# Patient Record
Sex: FEMALE | Race: WHITE | Employment: OTHER | URBAN - METROPOLITAN AREA
[De-identification: names, ages, dates, MRNs, and addresses within clinical notes are randomized per-mention and may not be internally consistent; named-entity substitution may affect disease eponyms.]

---

## 2017-01-10 PROBLEM — S22.000A CLOSED COMPRESSION FRACTURE OF THORACIC VERTEBRA (HCC): Status: ACTIVE | Noted: 2017-01-10

## 2017-01-11 PROBLEM — M81.0 OSTEOPOROSIS: Status: ACTIVE | Noted: 2017-01-11

## 2017-01-11 PROBLEM — I10 HTN, GOAL BELOW 140/80: Status: ACTIVE | Noted: 2017-01-11

## 2017-01-11 PROBLEM — E78.5 LIPIDS BLOOD INCREASED: Status: ACTIVE | Noted: 2017-01-11

## 2017-01-11 PROBLEM — F41.1 ANXIETY AS ACUTE REACTION TO GROSS STRESS: Status: ACTIVE | Noted: 2017-01-11

## 2017-01-11 PROBLEM — F43.0 ANXIETY AS ACUTE REACTION TO GROSS STRESS: Status: ACTIVE | Noted: 2017-01-11

## 2017-01-12 PROBLEM — I65.23 ASYMPTOMATIC BILATERAL CAROTID ARTERY STENOSIS: Chronic | Status: ACTIVE | Noted: 2017-01-12

## 2019-09-04 LAB
AVERAGE GLUCOSE: NORMAL
HBA1C MFR BLD: 5.7 %

## 2020-01-07 LAB
ALBUMIN SERPL-MCNC: NORMAL G/DL
ALP BLD-CCNC: NORMAL U/L
ALT SERPL-CCNC: NORMAL U/L
ANION GAP SERPL CALCULATED.3IONS-SCNC: NORMAL MMOL/L
AST SERPL-CCNC: NORMAL U/L
BILIRUB SERPL-MCNC: NORMAL MG/DL
BUN BLDV-MCNC: NORMAL MG/DL
CALCIUM SERPL-MCNC: NORMAL MG/DL
CHLORIDE BLD-SCNC: NORMAL MMOL/L
CHOLESTEROL, TOTAL: NORMAL
CHOLESTEROL/HDL RATIO: NORMAL
CO2: NORMAL
CREAT SERPL-MCNC: NORMAL MG/DL
GFR CALCULATED: NORMAL
GLUCOSE BLD-MCNC: NORMAL MG/DL
HDLC SERPL-MCNC: NORMAL MG/DL
LDL CHOLESTEROL CALCULATED: NORMAL
POTASSIUM SERPL-SCNC: NORMAL MMOL/L
SODIUM BLD-SCNC: NORMAL MMOL/L
TOTAL PROTEIN: NORMAL
TRIGL SERPL-MCNC: NORMAL MG/DL
VLDLC SERPL CALC-MCNC: NORMAL MG/DL

## 2020-06-30 VITALS
DIASTOLIC BLOOD PRESSURE: 75 MMHG | WEIGHT: 143 LBS | BODY MASS INDEX: 30.85 KG/M2 | SYSTOLIC BLOOD PRESSURE: 123 MMHG | HEIGHT: 57 IN

## 2020-06-30 RX ORDER — MECLIZINE HYDROCHLORIDE 25 MG/1
25 TABLET ORAL DAILY PRN
COMMUNITY
End: 2020-07-09

## 2020-06-30 RX ORDER — ASPIRIN 81 MG/1
81 TABLET ORAL DAILY
COMMUNITY
End: 2020-07-09

## 2020-07-09 ENCOUNTER — OFFICE VISIT (OUTPATIENT)
Dept: PRIMARY CARE CLINIC | Age: 80
End: 2020-07-09
Payer: MEDICARE

## 2020-07-09 VITALS
SYSTOLIC BLOOD PRESSURE: 138 MMHG | HEIGHT: 60 IN | OXYGEN SATURATION: 96 % | DIASTOLIC BLOOD PRESSURE: 75 MMHG | WEIGHT: 135 LBS | HEART RATE: 79 BPM | RESPIRATION RATE: 18 BRPM | BODY MASS INDEX: 26.5 KG/M2 | TEMPERATURE: 97.5 F

## 2020-07-09 PROBLEM — F41.1 ANXIETY AS ACUTE REACTION TO GROSS STRESS: Status: RESOLVED | Noted: 2017-01-11 | Resolved: 2020-07-09

## 2020-07-09 PROBLEM — G31.84 MCI (MILD COGNITIVE IMPAIRMENT): Status: ACTIVE | Noted: 2020-07-09

## 2020-07-09 PROBLEM — I10 ESSENTIAL HYPERTENSION: Status: ACTIVE | Noted: 2020-07-09

## 2020-07-09 PROBLEM — F43.0 ANXIETY AS ACUTE REACTION TO GROSS STRESS: Status: RESOLVED | Noted: 2017-01-11 | Resolved: 2020-07-09

## 2020-07-09 PROBLEM — E78.5 LIPIDS BLOOD INCREASED: Status: RESOLVED | Noted: 2017-01-11 | Resolved: 2020-07-09

## 2020-07-09 PROBLEM — I10 HTN, GOAL BELOW 140/80: Status: RESOLVED | Noted: 2017-01-11 | Resolved: 2020-07-09

## 2020-07-09 PROCEDURE — 99213 OFFICE O/P EST LOW 20 MIN: CPT | Performed by: INTERNAL MEDICINE

## 2020-07-09 RX ORDER — ALENDRONATE SODIUM 70 MG/1
TABLET ORAL
COMMUNITY
Start: 2020-05-04 | End: 2020-10-13

## 2020-07-09 ASSESSMENT — PATIENT HEALTH QUESTIONNAIRE - PHQ9
SUM OF ALL RESPONSES TO PHQ QUESTIONS 1-9: 0
2. FEELING DOWN, DEPRESSED OR HOPELESS: 0
1. LITTLE INTEREST OR PLEASURE IN DOING THINGS: 0
SUM OF ALL RESPONSES TO PHQ9 QUESTIONS 1 & 2: 0
SUM OF ALL RESPONSES TO PHQ QUESTIONS 1-9: 0

## 2020-07-09 NOTE — PROGRESS NOTES
Brenda Alamo  7/[birth year]     Chief Complaint   Patient presents with    Hyperlipidemia     check up        Allergies   Allergen Reactions    Alendronate Sodium         Current Outpatient Medications   Medication Sig Dispense Refill    alendronate (FOSAMAX) 70 MG tablet take 1 tablet by mouth every week      buPROPion (WELLBUTRIN SR) 150 MG extended release tablet Take 150 mg by mouth 2 times daily  3    desvenlafaxine succinate (PRISTIQ) 100 MG TB24 extended release tablet Take 100 mg by mouth daily  3     No current facility-administered medications for this visit. HPI: Patient comes in for routine follow-up visit. Her family is concerned because she has been somewhat delusional at times and suspicious that her family members are stealing from her. She is living alone and has been independent for quite some time. Her family checks on her frequently. She is physically active and walks in her neighborhood. She is still cooking for herself. She is not driving currently. Her family states that she does not take her medication as prescribed. She has a history of chronic anxiety and depression, hyperlipidemia, and osteoporosis. Daughter is requesting referral to a geriatric specialist for further evaluation and treatment. She does have prescriptions for Pristiq 100 mg daily and Wellbutrin  mg daily, but it is doubtful that she is taking either 1 of them at this time. Review of Systems: as per HPI      Physical Exam:    Patient is a [de-identified] y.o. female. Patient appears to be in no distress. Breathing comfortably. Ambulates without assistance. HEENT: normal.  Neck supple, no adenopathy or bruits. Heart RR, no MGR. Lungs clear. Abd: normal  Ext.: no edema. Peripheral pulses: normal.  No neurologic deficits noted. She becomes agitated when talking about her children.      Lab Results   Component Value Date    WBC 3.5 (L) 01/10/2017    HGB 12.0 01/10/2017    HCT 36.2 01/10/2017     01/10/2017    CHOL 265 (H) 01/10/2017    TRIG 217 (H) 01/10/2017    HDL 44 01/10/2017    ALT 16 01/10/2017    AST 16 01/10/2017    TSH 2.660 01/10/2017    INR 1.1 01/09/2017    LABA1C 5.7 09/04/2019      Lab Results   Component Value Date     01/10/2017    K 4.4 01/10/2017     01/10/2017    CO2 26 01/10/2017    BUN 16 01/10/2017    CREATININE 0.7 01/10/2017    GLUCOSE 93 01/10/2017    CALCIUM 8.9 01/10/2017    PROT 6.1 (L) 01/10/2017    LABALBU 4.0 01/10/2017    BILITOT 0.3 01/10/2017    ALKPHOS 52 01/10/2017    AST 16 01/10/2017    ALT 16 01/10/2017    LABGLOM >60 01/10/2017    GFRAA >60 01/10/2017            Assessment and Plan:    Brian Pemberton was seen today for hyperlipidemia. Diagnoses and all orders for this visit:    Mixed hyperlipidemia    Essential hypertension    Anxiety and depression    Age-related osteoporosis without current pathological fracture    MCI (mild cognitive impairment), associated with delusional thinking and noncompliance with her medications. Discussion Notes: Patient is strongly advised to take her medications as prescribed. Advised referral to geriatric specialist but she adamantly refuses. Will get labs including a CMP, CBC, lipid panel, and TSH, and will make further recommendations depending on results. No follow-ups on file.

## 2020-07-09 NOTE — PROGRESS NOTES
I talked to the patient's daughter, Marlin Bermudez, regarding today's visit with Gerardo Elmore. The patient probably has dementia with delusional thinking and she is uncooperative with the family. The family requested referral to a geriatric specialist.  We will refer her to Dr. Emily Nunez, geriatric specialist, for further evaluation and treatment of her dementia. Hopefully she will agree to go to that appointment. GERD contacts at this time are: Salima Vasques, [address] [facility], and Antoine Butterfield, [phone].

## 2020-07-14 ENCOUNTER — TELEPHONE (OUTPATIENT)
Dept: PRIMARY CARE CLINIC | Age: 80
End: 2020-07-14

## 2020-07-14 NOTE — TELEPHONE ENCOUNTER
Dr Maine Mckeon did ref to Dr Yolette Salmeron.  Family asking if we schedule that or if Yolette Salmeron office will call them     [facility] [phone]

## 2020-09-08 RX ORDER — ROSUVASTATIN CALCIUM 40 MG/1
40 TABLET, COATED ORAL EVERY EVENING
Qty: 90 TABLET | Refills: 3 | Status: SHIPPED
Start: 2020-09-08 | End: 2020-10-13 | Stop reason: SDUPTHER

## 2020-09-08 RX ORDER — ROSUVASTATIN CALCIUM 40 MG/1
40 TABLET, COATED ORAL EVERY EVENING
COMMUNITY
End: 2020-09-08 | Stop reason: SDUPTHER

## 2020-09-30 ENCOUNTER — INITIAL CONSULT (OUTPATIENT)
Dept: GERIATRIC MEDICINE | Age: 80
End: 2020-09-30
Payer: MEDICARE

## 2020-09-30 VITALS
BODY MASS INDEX: 29.77 KG/M2 | WEIGHT: 138 LBS | HEIGHT: 57 IN | TEMPERATURE: 97.1 F | HEART RATE: 108 BPM | DIASTOLIC BLOOD PRESSURE: 60 MMHG | RESPIRATION RATE: 24 BRPM | SYSTOLIC BLOOD PRESSURE: 124 MMHG

## 2020-09-30 PROCEDURE — 1036F TOBACCO NON-USER: CPT | Performed by: INTERNAL MEDICINE

## 2020-09-30 PROCEDURE — 1090F PRES/ABSN URINE INCON ASSESS: CPT | Performed by: INTERNAL MEDICINE

## 2020-09-30 PROCEDURE — G8417 CALC BMI ABV UP PARAM F/U: HCPCS | Performed by: INTERNAL MEDICINE

## 2020-09-30 PROCEDURE — 1123F ACP DISCUSS/DSCN MKR DOCD: CPT | Performed by: INTERNAL MEDICINE

## 2020-09-30 PROCEDURE — G8399 PT W/DXA RESULTS DOCUMENT: HCPCS | Performed by: INTERNAL MEDICINE

## 2020-09-30 PROCEDURE — 99212 OFFICE O/P EST SF 10 MIN: CPT | Performed by: INTERNAL MEDICINE

## 2020-09-30 PROCEDURE — G8427 DOCREV CUR MEDS BY ELIG CLIN: HCPCS | Performed by: INTERNAL MEDICINE

## 2020-09-30 PROCEDURE — 4040F PNEUMOC VAC/ADMIN/RCVD: CPT | Performed by: INTERNAL MEDICINE

## 2020-09-30 NOTE — PROGRESS NOTES
Chief Complaint   Patient presents with    Consultation     Referral from PCP, Dr Frances Padron re: dementia. Pt states she lives alone in a 4 bedroom home, drives, does grocery shopping, cooks, does her banking, takes care of her dog -- walks the dog at least 1/2 hour each night. \"I take care of everything\". Does tell repetative stories. Does not want her family in the exam room with her.

## 2020-09-30 NOTE — PROGRESS NOTES
Met with 2 kids   And Mother on [address] alone   Good days and bad days   Someone there every day   ADL's OK Might be changing   Uses stove and no fires   Never used microwave   Speaks English  Lots of paranoia   Paranoid about many things Written epistle  Many notes to remember  Kids are stealing   Memory issues are progressing   Dad  3 years ago   NOT TAKING MEDS AND WILL NOT  IADL's covered and probaby should not   Knows a lot of details  And 2/3  Fix  Knew details of family and kids names   HYM? \"Fine\"  Repetitive   Epistle read  [facility] year? Anniversary ? Knows details of his death  No ETOH  Cooking good   Guns may be in house? ?   No hallucinations  And is delusion  BB OK  Impression:Early SDAT and early                     ADL preservation                     Paranoia   Plan: Consider med therapy and follow

## 2020-10-06 ENCOUNTER — TELEPHONE (OUTPATIENT)
Dept: GERIATRIC MEDICINE | Age: 80
End: 2020-10-06

## 2020-10-06 NOTE — TELEPHONE ENCOUNTER
Pt seen on consult on 9/30/2020. Daughter, Itzel Olsen would like DR to call her to give her an update, answer any questions. Daughter is listed on the HIPAA form.

## 2020-10-12 RX ORDER — DONEPEZIL HYDROCHLORIDE 5 MG/1
5 TABLET, FILM COATED ORAL
Qty: 30 TABLET | Refills: 3 | Status: SHIPPED
Start: 2020-10-12 | End: 2021-02-03

## 2020-10-13 ENCOUNTER — TELEPHONE (OUTPATIENT)
Dept: PRIMARY CARE CLINIC | Age: 80
End: 2020-10-13

## 2020-10-13 RX ORDER — BUPROPION HYDROCHLORIDE 150 MG/1
150 TABLET, EXTENDED RELEASE ORAL 2 TIMES DAILY
Qty: 60 TABLET | Refills: 5 | Status: SHIPPED
Start: 2020-10-13 | End: 2021-02-03

## 2020-10-13 RX ORDER — ALENDRONATE SODIUM 70 MG/1
TABLET ORAL
Qty: 4 TABLET | Refills: 5 | Status: CANCELLED | OUTPATIENT
Start: 2020-10-13

## 2020-10-13 RX ORDER — ROSUVASTATIN CALCIUM 40 MG/1
40 TABLET, COATED ORAL EVERY EVENING
Qty: 30 TABLET | Refills: 5 | Status: SHIPPED
Start: 2020-10-13 | End: 2021-02-03

## 2020-10-13 RX ORDER — DESVENLAFAXINE 100 MG/1
100 TABLET, EXTENDED RELEASE ORAL DAILY
Qty: 30 TABLET | Refills: 5 | Status: SHIPPED
Start: 2020-10-13 | End: 2021-02-03

## 2020-10-15 ENCOUNTER — TELEPHONE (OUTPATIENT)
Dept: PRIMARY CARE CLINIC | Age: 80
End: 2020-10-15

## 2020-10-15 NOTE — TELEPHONE ENCOUNTER
TREASURE. ...... Colin Mclaughlin Daughter phoned Pristiq very expensive. Family is going to just try Wellbutrin, Aricept, and rosuvastatin. They will follow up here in a month or so.

## 2021-01-25 ENCOUNTER — TELEPHONE (OUTPATIENT)
Dept: GERIATRIC MEDICINE | Age: 81
End: 2021-01-25

## 2021-01-25 NOTE — TELEPHONE ENCOUNTER
Pt daughter called states that mom has appt on Feb 3. Family is requesting that you tell mom she has alz and needs to take her meds.  Daughter states that mom throws her meds away

## 2021-02-03 ENCOUNTER — OFFICE VISIT (OUTPATIENT)
Dept: GERIATRIC MEDICINE | Age: 81
End: 2021-02-03
Payer: MEDICARE

## 2021-02-03 VITALS
SYSTOLIC BLOOD PRESSURE: 116 MMHG | RESPIRATION RATE: 18 BRPM | TEMPERATURE: 97.3 F | HEART RATE: 96 BPM | DIASTOLIC BLOOD PRESSURE: 68 MMHG

## 2021-02-03 DIAGNOSIS — F02.818 LATE ONSET ALZHEIMER'S DISEASE WITH BEHAVIORAL DISTURBANCE (HCC): Primary | ICD-10-CM

## 2021-02-03 DIAGNOSIS — G30.1 LATE ONSET ALZHEIMER'S DISEASE WITH BEHAVIORAL DISTURBANCE (HCC): Primary | ICD-10-CM

## 2021-02-03 PROCEDURE — G8427 DOCREV CUR MEDS BY ELIG CLIN: HCPCS | Performed by: INTERNAL MEDICINE

## 2021-02-03 PROCEDURE — G8417 CALC BMI ABV UP PARAM F/U: HCPCS | Performed by: INTERNAL MEDICINE

## 2021-02-03 PROCEDURE — 1090F PRES/ABSN URINE INCON ASSESS: CPT | Performed by: INTERNAL MEDICINE

## 2021-02-03 PROCEDURE — 99212 OFFICE O/P EST SF 10 MIN: CPT | Performed by: INTERNAL MEDICINE

## 2021-02-03 PROCEDURE — G8399 PT W/DXA RESULTS DOCUMENT: HCPCS | Performed by: INTERNAL MEDICINE

## 2021-02-03 PROCEDURE — 1123F ACP DISCUSS/DSCN MKR DOCD: CPT | Performed by: INTERNAL MEDICINE

## 2021-02-03 PROCEDURE — 1036F TOBACCO NON-USER: CPT | Performed by: INTERNAL MEDICINE

## 2021-02-03 PROCEDURE — G8484 FLU IMMUNIZE NO ADMIN: HCPCS | Performed by: INTERNAL MEDICINE

## 2021-02-03 PROCEDURE — 4040F PNEUMOC VAC/ADMIN/RCVD: CPT | Performed by: INTERNAL MEDICINE

## 2021-02-03 SDOH — ECONOMIC STABILITY: INCOME INSECURITY: HOW HARD IS IT FOR YOU TO PAY FOR THE VERY BASICS LIKE FOOD, HOUSING, MEDICAL CARE, AND HEATING?: NOT HARD AT ALL

## 2021-02-03 SDOH — ECONOMIC STABILITY: TRANSPORTATION INSECURITY
IN THE PAST 12 MONTHS, HAS LACK OF TRANSPORTATION KEPT YOU FROM MEETINGS, WORK, OR FROM GETTING THINGS NEEDED FOR DAILY LIVING?: NOT ASKED

## 2021-02-03 SDOH — ECONOMIC STABILITY: FOOD INSECURITY: WITHIN THE PAST 12 MONTHS, YOU WORRIED THAT YOUR FOOD WOULD RUN OUT BEFORE YOU GOT MONEY TO BUY MORE.: NEVER TRUE

## 2021-02-03 ASSESSMENT — PATIENT HEALTH QUESTIONNAIRE - PHQ9
1. LITTLE INTEREST OR PLEASURE IN DOING THINGS: 0
2. FEELING DOWN, DEPRESSED OR HOPELESS: 0
SUM OF ALL RESPONSES TO PHQ QUESTIONS 1-9: 0

## 2021-02-03 NOTE — PROGRESS NOTES
Chief Complaint   Patient presents with    Follow-up     2nd visit. Last seen in September re: memory issues. See 1/25/21 phone encounter note from daughter -- wants DR to tell pt that she has Alzheimer's and needs to take her med. (Note indicates that daughter feels pt throws her meds out).  Discuss Medications     Pt states she does not take any meds.

## 2021-02-03 NOTE — PROGRESS NOTES
CC No complaints except waiting too long  HAving no insight into her Dementia    Here alone and would like to finish exam ASAP  \"Her son is wailing for her\"  President : Tsering [place]os[place] but trouble naming him  May be driving and unsure what is going on with IADL's except we know she doesn't take meds  She claims she is driving to [address] she is cooking  And No written epistle available  Appears  to understand English  And not willing to try on some questions like spelling [place]  But did subtract 7 from 1100 and 93 but unwilling to go on  17/27 and probable mild Dementia at home withj family support  May be doing her ADL's and dressed well  Unwilling/able to take meds  Plan; Continue to support her at home and keep safe

## 2021-02-05 ENCOUNTER — TELEPHONE (OUTPATIENT)
Dept: GERIATRIC MEDICINE | Age: 81
End: 2021-02-05

## 2021-02-05 NOTE — TELEPHONE ENCOUNTER
Called daughter and left message and explained my main concern is to keep her safe at home, knowing that she will not take any meds.

## 2021-02-05 NOTE — TELEPHONE ENCOUNTER
Pt daughter called states that evangelina was here on Wed and would not leave son come back with her.  Daughter called and wants to know what you told celestino Wed Please call her

## 2021-04-10 ENCOUNTER — HOSPITAL ENCOUNTER (EMERGENCY)
Age: 81
Discharge: HOME | End: 2021-04-10
Payer: MEDICARE

## 2021-04-10 ENCOUNTER — APPOINTMENT (OUTPATIENT)
Dept: GENERAL RADIOLOGY | Age: 81
End: 2021-04-10
Payer: MEDICARE

## 2021-04-10 VITALS
HEIGHT: 60 IN | OXYGEN SATURATION: 97 % | DIASTOLIC BLOOD PRESSURE: 79 MMHG | SYSTOLIC BLOOD PRESSURE: 123 MMHG | BODY MASS INDEX: 27.48 KG/M2 | RESPIRATION RATE: 14 BRPM | TEMPERATURE: 97 F | HEART RATE: 91 BPM | WEIGHT: 140 LBS

## 2021-04-10 DIAGNOSIS — S60.221A CONTUSION OF RIGHT HAND, INITIAL ENCOUNTER: ICD-10-CM
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
## 2021-04-10 DIAGNOSIS — S62.91XA CLOSED FRACTURE OF RIGHT HAND, INITIAL ENCOUNTER: Primary | ICD-10-CM

## 2021-04-10 PROCEDURE — 73130 X-RAY EXAM OF HAND: CPT

## 2021-04-10 PROCEDURE — 99284 EMERGENCY DEPT VISIT MOD MDM: CPT

## 2021-04-10 PROCEDURE — 73090 X-RAY EXAM OF FOREARM: CPT

## 2021-04-10 PROCEDURE — 29125 APPL SHORT ARM SPLINT STATIC: CPT

## 2021-04-10 PROCEDURE — 73110 X-RAY EXAM OF WRIST: CPT

## 2021-04-10 PROCEDURE — 99283 EMERGENCY DEPT VISIT LOW MDM: CPT

## 2021-04-10 RX ORDER — ACETAMINOPHEN 500 MG
500 TABLET ORAL EVERY 6 HOURS PRN
Qty: 60 TABLET | Refills: 3 | Status: SHIPPED | OUTPATIENT
Start: 2021-04-10 | End: 2021-09-14

## 2021-04-10 ASSESSMENT — PAIN DESCRIPTION - LOCATION: LOCATION: ARM;HAND

## 2021-04-10 ASSESSMENT — PAIN DESCRIPTION - ORIENTATION: ORIENTATION: RIGHT

## 2021-04-10 ASSESSMENT — PAIN SCALES - GENERAL: PAINLEVEL_OUTOF10: 8

## 2021-04-10 NOTE — ED PROVIDER NOTES
[facility]     Department of Emergency Medicine   ED  Provider Note  Admit Date/RoomTime: 4/10/2021  9:25 AM  ED Room: 29/29    Chief Complaint:   Hand Injury (mechanical fall yesterday ) and Arm Injury    History of Present Illness      Robert Montilla is a 80 y.o. old female presents to the emergency department for right hand and wrist pain. Patient states she tripped while she was letting her dog outlast night. He fell onto her left hand. She denies hitting her head or having any loss of consciousness. Patient has significant swelling to right hand and wrist. She has pain with rotation and movement of her hand and wrist. She has no headache, dizziness, abdominal pain, nausea, vomiting, vision changes, chest pain, SOB, leg pain or swelling, neck pain, back pain, loss of bowel or bladder, or difficulty with ambulation or balance. Patient is alert and oriented x3 and in no apparent distress at this exam. Patient is nontoxic appearing. PCP: Dr. Abraham BRYAN   Pertinent positives and negatives are stated within HPI, all other systems reviewed and are negative. Past Medical History:   Past Medical History:   Diagnosis Date    Anxiety as acute reaction to gross stress 1/11/2017    Asymptomatic bilateral carotid artery stenosis 1/12/2017    HTN, goal below 140/80 1/11/2017    STRESS ??   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    DILATION AND CURETTAGE OF UTERUS  3/20/2002    uterine fibroids x 2    FIXATION KYPHOPLASTY  01/12/2017    t7     Social History:  reports that she has never smoked. She has never used smokeless tobacco. She reports that she does not drink alcohol or use drugs.     Family History: family history is not on file. Allergies: Alendronate sodium    Physical Exam     Vitals:    04/10/21 0911   BP: 123/79   Pulse: 91   Resp: 14   Temp: 97 °F (36.1 °C)   TempSrc: Temporal   SpO2: 97%   Weight: 140 lb (63.5 kg)   Height: 5' (1.524 m)     Oxygen Saturation Interpretation: Normal.    Constitutional:  Alert and oriented x3, development consistent with age, NAD  HEENT:  NC/NT. Airway patent. No bony tenderness, no abrasions or lacerations, no blood in ears or nares, PERRLA, EOMI   Neck:  Normal ROM. Supple. Non tender   CVS: Regular rate for age, normal rhythm  Resp: Clear and equal bilaterally with good airflow, no respiratory distress  Chest: Non-tender   Extremity(s):  Right: hand and wrist.               Tenderness: Moderate to wrist and dorsal aspect of hand             Swelling: Moderate. Maximal swelling to dorsal aspect of right hand, mild to wrist              Deformity[de-identified] Slight             ROM: diminished range with pain. Majority of pain in wrist with rotation              Skin:  no erythema, rash or wounds noted. Mild early stage bruising    Neurovascular: Motor deficit: none. Sensory deficit: none. Intact distally                Pulse deficit: none. Strong radial                Capillary refill: normal. <3 seconds x5 fingers   Ring removed from 4th and 5th fingers  No pain with palpation to right elbow or shoulder  No TTP to left UE or legs   Neurological: GCS 15 Motor functions intact. Lab / Imaging Results   (All laboratory and radiology results have been personally reviewed by myself)  Labs:  No results found for this visit on 04/10/21. Imaging: All Radiology results interpreted by Radiologist unless otherwise noted. XR WRIST RIGHT (MIN 3 VIEWS)   Final Result   There are signs of remote trauma involving the trapezium with secondary   erosive osteoarthritic changes at the 1st metacarpal-phalangeal and 1st   metacarpal there navicular articulations. XR HAND RIGHT (MIN 3 VIEWS)   Final Result   Degenerative changes in the right wrist and the fingers as commented the,   particularly in the radial side of the carpal-metacarpal joint. No acute fracture seen in the right hand. XR RADIUS ULNA RIGHT (2 VIEWS)   Final Result   1. Minimal impaction of the base of radial head of undetermined age. Correlation with x-ray series of the right elbow recommended and also with   clinical data. 2.  Mild soft tissue swelling in the distal forearm towards the dorsal aspect   of the right hand. 3.  No fracture seen in the right ulna. ED Course / Medical Decision Making   Medications - No data to display    ED Course as of Apr 10 1131   Sat Apr 10, 2021   1030 XR showed age indeterminate radial head impaction fracture- patient has NO point tenderness to elbow    XR RADIUS ULNA RIGHT (2 VIEWS) [RM]      ED Course User Index  [RM] Kerrin Kanner, PA-C     Consult:  None    Procedure(s):  none    MDM:       Films were obtained based on high suspicion for bony injury as per history/physical findings . Plan is subsequently for symptom control, limited use and  immobilization with appropriate outpatient follow-up. XR showed age indeterminate radial head fx- patient has NO point tenderness to elbow     Counseling: The emergency provider has spoken with the patient/caregiver and discussed todays results, in addition to providing specific details for the plan of care and counseling regarding the diagnosis and prognosis. Questions are answered at this time and they are agreeable with the plan. Patient understands that they must follow-up with orthopedics. They're educated on signs and symptoms that would require emergent return to the ED. RICE discussed. They were educated on newly prescribed medications and instructed on sling and splint use. Vitals stable and patient in no distress at discharge. Assessment      1.  Closed fracture of right hand, initial encounter    2. Fall on same level from slipping, tripping or stumbling, initial encounter    3. Contusion of right hand, initial encounter      Plan   Discharge to home  Patient condition is good    New Medications     New Prescriptions    ACETAMINOPHEN (APAP EXTRA STRENGTH) 500 MG TABLET    Take 1 tablet by mouth every 6 hours as needed for Pain       Electronically signed by Monty Chavez PA-C   DD: 4/10/21  **This report was transcribed using voice recognition software. Every effort was made to ensure accuracy; however, inadvertent computerized transcription errors may be present.   END OF ED PROVIDER NOTE        Monty Chavez PA-C  04/10/21 1137

## 2021-06-14 ENCOUNTER — TELEPHONE (OUTPATIENT)
Dept: GERIATRIC MEDICINE | Age: 81
End: 2021-06-14

## 2021-08-10 ENCOUNTER — OFFICE VISIT (OUTPATIENT)
Dept: GERIATRIC MEDICINE | Age: 81
End: 2021-08-10
Payer: MEDICARE

## 2021-08-10 VITALS
DIASTOLIC BLOOD PRESSURE: 66 MMHG | HEIGHT: 58 IN | TEMPERATURE: 97.3 F | BODY MASS INDEX: 29.72 KG/M2 | SYSTOLIC BLOOD PRESSURE: 128 MMHG | WEIGHT: 141.6 LBS | HEART RATE: 84 BPM | RESPIRATION RATE: 20 BRPM

## 2021-08-10 DIAGNOSIS — G30.9 ALZHEIMER DISEASE (HCC): Primary | ICD-10-CM

## 2021-08-10 DIAGNOSIS — F02.80 ALZHEIMER DISEASE (HCC): Primary | ICD-10-CM

## 2021-08-10 PROCEDURE — G8399 PT W/DXA RESULTS DOCUMENT: HCPCS | Performed by: INTERNAL MEDICINE

## 2021-08-10 PROCEDURE — 99212 OFFICE O/P EST SF 10 MIN: CPT | Performed by: INTERNAL MEDICINE

## 2021-08-10 PROCEDURE — 1036F TOBACCO NON-USER: CPT | Performed by: INTERNAL MEDICINE

## 2021-08-10 PROCEDURE — 1090F PRES/ABSN URINE INCON ASSESS: CPT | Performed by: INTERNAL MEDICINE

## 2021-08-10 PROCEDURE — G8417 CALC BMI ABV UP PARAM F/U: HCPCS | Performed by: INTERNAL MEDICINE

## 2021-08-10 PROCEDURE — G8427 DOCREV CUR MEDS BY ELIG CLIN: HCPCS | Performed by: INTERNAL MEDICINE

## 2021-08-10 PROCEDURE — 4040F PNEUMOC VAC/ADMIN/RCVD: CPT | Performed by: INTERNAL MEDICINE

## 2021-08-10 PROCEDURE — 1123F ACP DISCUSS/DSCN MKR DOCD: CPT | Performed by: INTERNAL MEDICINE

## 2021-08-10 RX ORDER — DONEPEZIL HYDROCHLORIDE 5 MG/1
2.5 TABLET, FILM COATED ORAL
Qty: 15 TABLET | Refills: 3 | Status: SHIPPED
Start: 2021-08-10 | End: 2021-09-14

## 2021-08-10 ASSESSMENT — LIFESTYLE VARIABLES
HOW OFTEN DO YOU HAVE A DRINK CONTAINING ALCOHOL: 2-3 TIMES A WEEK
HOW MANY STANDARD DRINKS CONTAINING ALCOHOL DO YOU HAVE ON A TYPICAL DAY: 1 OR 2

## 2021-08-10 ASSESSMENT — PATIENT HEALTH QUESTIONNAIRE - PHQ9
SUM OF ALL RESPONSES TO PHQ QUESTIONS 1-9: 0
1. LITTLE INTEREST OR PLEASURE IN DOING THINGS: 0
2. FEELING DOWN, DEPRESSED OR HOPELESS: 0
SUM OF ALL RESPONSES TO PHQ9 QUESTIONS 1 & 2: 0
SUM OF ALL RESPONSES TO PHQ QUESTIONS 1-9: 0
SUM OF ALL RESPONSES TO PHQ QUESTIONS 1-9: 0

## 2021-08-10 NOTE — PATIENT INSTRUCTIONS
Patient Education        Preventing Falls: Care Instructions  Your Care Instructions     Getting around your home safely can be a challenge if you have injuries or health problems that make it easy for you to fall. Loose rugs and furniture in walkways are among the dangers for many older people who have problems walking or who have poor eyesight. People who have conditions such as arthritis, osteoporosis, or dementia also have to be careful not to fall. You can make your home safer with a few simple measures. Follow-up care is a key part of your treatment and safety. Be sure to make and go to all appointments, and call your doctor if you are having problems. It's also a good idea to know your test results and keep a list of the medicines you take. How can you care for yourself at home? Taking care of yourself  · You may get dizzy if you do not drink enough water. To prevent dehydration, drink plenty of fluids. Choose water and other caffeine-free clear liquids. If you have kidney, heart, or liver disease and have to limit fluids, talk with your doctor before you increase the amount of fluids you drink. · Exercise regularly to improve your strength, muscle tone, and balance. Walk if you can. Swimming may be a good choice if you cannot walk easily. · Have your vision and hearing checked each year or any time you notice a change. If you have trouble seeing and hearing, you might not be able to avoid objects and could lose your balance. · Know the side effects of the medicines you take. Ask your doctor or pharmacist whether the medicines you take can affect your balance. Sleeping pills or sedatives can affect your balance. · Limit the amount of alcohol you drink. Alcohol can impair your balance and other senses. · Ask your doctor whether calluses or corns on your feet need to be removed. If you wear loose-fitting shoes because of calluses or corns, you can lose your balance and fall.   · Talk to your doctor if you have numbness in your feet. Preventing falls at home  · Remove raised doorway thresholds, throw rugs, and clutter. Repair loose carpet or raised areas in the floor. · Move furniture and electrical cords to keep them out of walking paths. · Use nonskid floor wax, and wipe up spills right away, especially on ceramic tile floors. · If you use a walker or cane, put rubber tips on it. If you use crutches, clean the bottoms of them regularly with an abrasive pad, such as steel wool. · Keep your house well lit, especially [place], and outside walkways. Use night-lights in areas such as hallways and bathrooms. Add extra light switches or use remote switches (such as switches that go on or off when you clap your hands) to make it easier to turn lights on if you have to get up during the night. · Install sturdy handrails on stairways. · Move items in your cabinets so that the things you use a lot are on the lower shelves (about waist level). · Keep a cordless phone and a flashlight with new batteries by your bed. If possible, put a phone in each of the main rooms of your house, or carry a cell phone in case you fall and cannot reach a phone. Or, you can wear a device around your neck or wrist. You push a button that sends a signal for help. · Wear low-heeled shoes that fit well and give your feet good support. Use footwear with nonskid soles. Check the heels and soles of your shoes for wear. Repair or replace worn heels or soles. · Do not wear socks without shoes on wood floors. · Walk on the grass when the sidewalks are slippery. If you live in an area that gets snow and ice in the winter, sprinkle salt on slippery steps and sidewalks. Preventing falls in the bath  · Install grab bars and nonskid mats inside and outside your shower or tub and near the toilet and sinks. · Use shower chairs and bath benches. · Use a hand-held shower head that will allow you to sit while showering.   · Get into a tub or shower by putting the weaker leg in first. Get out of a tub or shower with your strong side first.  · Repair loose toilet seats and consider installing a raised toilet seat to make getting on and off the toilet easier. · Keep your bathroom door unlocked while you are in the shower. Where can you learn more? Go to [email or web address]://[email or web address] org and sign in to your [employer] account. Enter [phone] in the [facility] box to learn more about \"Preventing Falls: Care Instructions. \"     If you do not have an account, please click on the \"Sign Up Now\" link. Current as of: December 7, 2020               Content Version: 12.9  © 8298-8319 [employer], [employer]orporated. Care instructions adapted under license by [facility] ([facility]). If you have questions about a medical condition or this instruction, always ask your healthcare professional. [place] [address] any warranty or liability for your use of this information.

## 2021-08-10 NOTE — PROGRESS NOTES
Chief Complaint   Patient presents with    6 Month Follow-Up     February follow up re: probable mild dementia. Here with son, Klebre.

## 2021-08-18 NOTE — TELEPHONE ENCOUNTER
Advised daughter per Dr Sanjuanita Hedrick office pts appt is 9/30 at 145 Implemented All Universal Safety Interventions:  [place] to call system. Call bell, personal items and telephone within reach. Instruct patient to call for assistance. Room bathroom lighting operational. Non-slip footwear when patient is off stretcher. Physically safe environment: no spills, clutter or unnecessary equipment. Stretcher in lowest position, wheels locked, appropriate side rails in place.

## 2021-09-12 ENCOUNTER — APPOINTMENT (OUTPATIENT)
Dept: CT IMAGING | Age: 81
End: 2021-09-12
Payer: MEDICARE

## 2021-09-12 ENCOUNTER — HOSPITAL ENCOUNTER (EMERGENCY)
Age: 81
Discharge: HOME | End: 2021-09-12
Payer: MEDICARE

## 2021-09-12 VITALS
TEMPERATURE: 97.4 F | RESPIRATION RATE: 15 BRPM | DIASTOLIC BLOOD PRESSURE: 92 MMHG | HEART RATE: 81 BPM | WEIGHT: 140 LBS | OXYGEN SATURATION: 96 % | BODY MASS INDEX: 29.26 KG/M2 | SYSTOLIC BLOOD PRESSURE: 149 MMHG

## 2021-09-12 DIAGNOSIS — S22.069A CLOSED FRACTURE OF SEVENTH THORACIC VERTEBRA, UNSPECIFIED FRACTURE MORPHOLOGY, INITIAL ENCOUNTER (HCC): ICD-10-CM

## 2021-09-12 DIAGNOSIS — M54.50 ACUTE EXACERBATION OF CHRONIC LOW BACK PAIN: Primary | ICD-10-CM

## 2021-09-12 DIAGNOSIS — D73.4 CYST OF SPLEEN: ICD-10-CM

## 2021-09-12 DIAGNOSIS — S22.079A CLOSED FRACTURE OF TENTH THORACIC VERTEBRA, UNSPECIFIED FRACTURE MORPHOLOGY, INITIAL ENCOUNTER (HCC): ICD-10-CM

## 2021-09-12 DIAGNOSIS — G89.29 ACUTE EXACERBATION OF CHRONIC LOW BACK PAIN: Primary | ICD-10-CM

## 2021-09-12 DIAGNOSIS — N28.1 RENAL CYST, LEFT: ICD-10-CM

## 2021-09-12 PROCEDURE — 72128 CT CHEST SPINE W/O DYE: CPT

## 2021-09-12 PROCEDURE — 96372 THER/PROPH/DIAG INJ SC/IM: CPT

## 2021-09-12 PROCEDURE — 6360000002 HC RX W HCPCS: Performed by: NURSE PRACTITIONER

## 2021-09-12 PROCEDURE — 6370000000 HC RX 637 (ALT 250 FOR IP): Performed by: NURSE PRACTITIONER

## 2021-09-12 PROCEDURE — 72125 CT NECK SPINE W/O DYE: CPT

## 2021-09-12 PROCEDURE — 72131 CT LUMBAR SPINE W/O DYE: CPT

## 2021-09-12 PROCEDURE — 99284 EMERGENCY DEPT VISIT MOD MDM: CPT

## 2021-09-12 RX ORDER — M-VIT,TX,IRON,MINS/CALC/FOLIC 27MG-0.4MG
1 TABLET ORAL DAILY
COMMUNITY
End: 2021-09-14

## 2021-09-12 RX ORDER — KETOROLAC TROMETHAMINE 30 MG/ML
30 INJECTION, SOLUTION INTRAMUSCULAR; INTRAVENOUS ONCE
Status: COMPLETED | OUTPATIENT
Start: 2021-09-12 | End: 2021-09-12

## 2021-09-12 RX ORDER — HYDROCODONE BITARTRATE AND ACETAMINOPHEN 5; 325 MG/1; MG/1
1 TABLET ORAL ONCE
Status: COMPLETED | OUTPATIENT
Start: 2021-09-12 | End: 2021-09-12

## 2021-09-12 RX ORDER — HYDROCODONE BITARTRATE AND ACETAMINOPHEN 5; 325 MG/1; MG/1
1 TABLET ORAL EVERY 6 HOURS PRN
Qty: 12 TABLET | Refills: 0 | Status: SHIPPED | OUTPATIENT
Start: 2021-09-12 | End: 2021-09-14

## 2021-09-12 RX ADMIN — HYDROCODONE BITARTRATE AND ACETAMINOPHEN 1 TABLET: 5; 325 TABLET ORAL at 12:13

## 2021-09-12 RX ADMIN — KETOROLAC TROMETHAMINE 30 MG: 30 INJECTION, SOLUTION INTRAMUSCULAR; INTRAVENOUS at 14:29

## 2021-09-12 ASSESSMENT — PAIN DESCRIPTION - PAIN TYPE: TYPE: ACUTE PAIN

## 2021-09-12 ASSESSMENT — PAIN SCALES - GENERAL
PAINLEVEL_OUTOF10: 9

## 2021-09-12 ASSESSMENT — PAIN DESCRIPTION - LOCATION: LOCATION: BACK

## 2021-09-12 ASSESSMENT — PAIN DESCRIPTION - ONSET: ONSET: GRADUAL

## 2021-09-12 ASSESSMENT — PAIN DESCRIPTION - ORIENTATION: ORIENTATION: LOWER

## 2021-09-12 ASSESSMENT — PAIN DESCRIPTION - PROGRESSION: CLINICAL_PROGRESSION: GRADUALLY WORSENING

## 2021-09-12 ASSESSMENT — PAIN - FUNCTIONAL ASSESSMENT: PAIN_FUNCTIONAL_ASSESSMENT: ACTIVITIES ARE NOT PREVENTED

## 2021-09-12 ASSESSMENT — PAIN DESCRIPTION - FREQUENCY: FREQUENCY: CONTINUOUS

## 2021-09-12 NOTE — ED PROVIDER NOTES
uterus (3/20/2002); and Fixation Kyphoplasty (01/12/2017). Social History:  reports that she has never smoked. She has never used smokeless tobacco. She reports that she does not drink alcohol and does not use drugs. Family History: family history is not on file. Allergies: Alendronate sodium    Physical Exam   Oxygen Saturation Interpretation: Normal.        ED Triage Vitals [09/12/21 1009]   BP Temp Temp Source Pulse Resp SpO2 Height Weight   (!) 149/92 97.4 °F (36.3 °C) Temporal 81 15 96 % -- 140 lb (63.5 kg)         Physical Exam  Constitutional:  Alert, development consistent with age. HEENT:  NC/NT. Airway patent. Neck:  No midline or paravertebral tenderness. Normal ROM. Supple. Chest:  Symmetrical without visible rash or tenderness. Respiratory:  Lungs Clear to auscultation and breath sounds equal.  CV:  Regular rate and rhythm, normal heart sounds, without pathological murmurs, ectopy, gallops, or rubs. GI:  Abdomen Soft, nontender, good bowel sounds. No firm or pulsatile mass. Pelvis:  Stable, nontender to palpation. Back:  No midline or paravertebral tenderness. No costovertebral tenderness. Extremities: No tenderness or edema noted. Integument:  Normal turgor. Warm, dry, without visible rash, unless noted elsewhere. Lymphatic: no lymphadenopathy noted  Neurological:  Oriented x3, GCS 15. Motor functions intact. Lab / Imaging Results   (All laboratory and radiology results have been personally reviewed by myself)  Labs:  No results found for this visit on 09/12/21. Imaging: All Radiology results interpreted by Radiologist unless otherwise noted. CT THORACIC SPINE WO CONTRAST   Final Result   Addendum 1 of 1   ADDENDUM:   CT lumbar spine. There is no acute fracture or dislocation in the lumbar spine. Mild    diffuse   degenerative changes are identified in the lumbar spine with disc bulges    at   L4-5 and L5-S1.   Cystic lesions are identified in the spleen and left    kidney. The gallbladder is distended. Consider ultrasonography. Impression      No acute fracture. Degenerative changes with disc bulges from L4-S1         Final      CT LUMBAR SPINE WO CONTRAST   Final Result   Addendum 1 of 1   ADDENDUM:   CT lumbar spine. There is no acute fracture or dislocation in the lumbar spine. Mild    diffuse   degenerative changes are identified in the lumbar spine with disc bulges    at   L4-5 and L5-S1. Cystic lesions are identified in the spleen and left    kidney. The gallbladder is distended. Consider ultrasonography. Impression      No acute fracture. Degenerative changes with disc bulges from L4-S1         Final      CT CERVICAL SPINE WO CONTRAST   Final Result   Addendum 1 of 1   ADDENDUM:   CT lumbar spine. There is no acute fracture or dislocation in the lumbar spine. Mild    diffuse   degenerative changes are identified in the lumbar spine with disc bulges    at   L4-5 and L5-S1. Cystic lesions are identified in the spleen and left    kidney. The gallbladder is distended. Consider ultrasonography. Impression      No acute fracture. Degenerative changes with disc bulges from L4-S1         Final        ED Course / Medical Decision Making     Medications   HYDROcodone-acetaminophen (NORCO) 5-325 MG per tablet 1 tablet (1 tablet Oral Given 9/12/21 1213)   ketorolac (TORADOL) injection 30 mg (30 mg IntraMUSCular Given 9/12/21 1429)        Re-examination:  9/12/21     Time:1440  Patients symptoms are improving and patient is ready to go home she is getting anxious and is ambulating with a steady henderson gait. Patient answering all questions appropriately discussed that someone should stay with her and opioid precautions.     Consult(s):   None    Procedure(s):  None    MDM:   This is a 54-year-old female who arrived today by private car with her son for complaints of midline back pain after falling on her back a few days ago.  Patient has a history of Alzheimer's and she does live alone and she performs her own ADLs and is alert and oriented today answering questions appropriately. Patient has no signs of acute cauda equina at this time. She has no signs of head trauma. Patient's son reports she has been wearing the back brace since Friday and states that she started having pain and it has worsened today and was brought to the ED for evaluation because over-the-counter medications are not working. She denies any burning on urination or urinary symptoms. The patient and her son were made aware of new T10 fracture and patient did not want to stay and wanted to go home. She feels she had improvement of pain after being medicated and was able to walk without any difficulty in the ED. They were offered admission and placement but son feels she can go home with a short course of pain medication safely which seem to be helping and he will keep close watch on her. Patient son advised on falls precaution while taking narcotic pain medication and he feels comfortable with her being discharged. Patient is afebrile, nontoxic in appearance, hemodynamically stable. Her CT of cervical spine was negative for any acute fractures. CT of cervical spine negative for acute fracture and did reveal diffuse degenerative changes with multilevel disc bulges. CT of the lumbar spine reveals diffuse degenerative changes with disc bulges at L4-5 and L5 and S1. Patient has no signs of acute cauda equina syndrome at this time. She is ambulating with no signs of distress or limited movements. Cystic lesions are identified in the spleen and in the left kidney the gallbladder is distended and she has no right upper quadrant pain, nausea or vomiting. CT of the thoracic spine reveals previous compression fracture at T7 with nearly 50% loss of height and vertebroplasty are noted and possible new T10 acute fracture with 15% loss of height.   Patient will be discharged with short course of Norco which she tolerated in ED and will be given opioid precautions on discharge. Advised patient and son on signs and symptoms warranting immediate return to the ED at any time for reevaluation and follow-up with her PCP for reevaluation of gallbladder distention which she has no pain and cystic lesions on her kidney and spleen. Patient has additionally seen Con Watters in the past for the kyphoplasty and will refer her back for reevaluation of new fracture in thoracic spine. Case discussed with Dr. Mike Carlin prior to disposition and as long as patient and son are comfortable with him taking her home will discharge. Controlled Substance Monitoring:    Acute and Chronic Pain Monitoring:   RX Monitoring 9/12/2021   Attestation -   Periodic Controlled Substance Monitoring Possible medication side effects, risk of tolerance/dependence & alternative treatments discussed. ;No signs of potential drug abuse or diversion identified. Plan of Care/Counseling:  BOAZ Paez CNP reviewed today's visit with the patient and patient and son in addition to providing specific details for the plan of care and counseling regarding the diagnosis and prognosis. Questions are answered at this time and are agreeable with the plan. Assessment      1. Acute exacerbation of chronic low back pain    2. Closed fracture of tenth thoracic vertebra, unspecified fracture morphology, initial encounter ([facility] [address]    3. Old Closed fracture of seventh thoracic vertebra, unspecified fracture morphology, initial encounter ([facility] [address]    4. Renal cyst, left    5. Cyst of spleen      Plan   Discharged home. Patient condition is good    New Medications     Discharge Medication List as of 9/12/2021  2:41 PM      START taking these medications    Details   HYDROcodone-acetaminophen (NORCO) 5-325 MG per tablet Take 1 tablet by mouth every 6 hours as needed for Pain for up to 3 days. , Disp-12 tablet, R-0Print Electronically signed by BOAZ Lopez CNP   DD: 9/12/21  **This report was transcribed using voice recognition software. Every effort was made to ensure accuracy; however, inadvertent computerized transcription errors may be present.   END OF ED PROVIDER NOTE      BOAZ Lopez CNP  09/13/21 0014

## 2021-09-14 ENCOUNTER — OFFICE VISIT (OUTPATIENT)
Dept: PRIMARY CARE CLINIC | Age: 81
End: 2021-09-14
Payer: MEDICARE

## 2021-09-14 VITALS
HEIGHT: 58 IN | DIASTOLIC BLOOD PRESSURE: 70 MMHG | OXYGEN SATURATION: 95 % | RESPIRATION RATE: 18 BRPM | SYSTOLIC BLOOD PRESSURE: 130 MMHG | BODY MASS INDEX: 29.39 KG/M2 | WEIGHT: 140 LBS | HEART RATE: 70 BPM | TEMPERATURE: 97.8 F

## 2021-09-14 DIAGNOSIS — G31.84 MCI (MILD COGNITIVE IMPAIRMENT): ICD-10-CM

## 2021-09-14 DIAGNOSIS — M81.0 AGE-RELATED OSTEOPOROSIS WITHOUT CURRENT PATHOLOGICAL FRACTURE: ICD-10-CM

## 2021-09-14 DIAGNOSIS — S22.000D CLOSED COMPRESSION FRACTURE OF THORACIC VERTEBRA WITH ROUTINE HEALING, SUBSEQUENT ENCOUNTER: Primary | ICD-10-CM

## 2021-09-14 PROCEDURE — 1090F PRES/ABSN URINE INCON ASSESS: CPT | Performed by: INTERNAL MEDICINE

## 2021-09-14 PROCEDURE — G8427 DOCREV CUR MEDS BY ELIG CLIN: HCPCS | Performed by: INTERNAL MEDICINE

## 2021-09-14 PROCEDURE — G8417 CALC BMI ABV UP PARAM F/U: HCPCS | Performed by: INTERNAL MEDICINE

## 2021-09-14 PROCEDURE — 4040F PNEUMOC VAC/ADMIN/RCVD: CPT | Performed by: INTERNAL MEDICINE

## 2021-09-14 PROCEDURE — 1123F ACP DISCUSS/DSCN MKR DOCD: CPT | Performed by: INTERNAL MEDICINE

## 2021-09-14 PROCEDURE — G8399 PT W/DXA RESULTS DOCUMENT: HCPCS | Performed by: INTERNAL MEDICINE

## 2021-09-14 PROCEDURE — 1036F TOBACCO NON-USER: CPT | Performed by: INTERNAL MEDICINE

## 2021-09-14 PROCEDURE — 99213 OFFICE O/P EST LOW 20 MIN: CPT | Performed by: INTERNAL MEDICINE

## 2021-09-14 RX ORDER — HYDROCODONE BITARTRATE AND ACETAMINOPHEN 5; 325 MG/1; MG/1
1 TABLET ORAL EVERY 6 HOURS PRN
Qty: 20 TABLET | Refills: 0 | Status: SHIPPED | OUTPATIENT
Start: 2021-09-14 | End: 2021-09-19

## 2021-09-14 NOTE — PROGRESS NOTES
Brenda Alamo  9/14/21     Chief Complaint   Patient presents with    Follow-up     ER9/12/21 compression fracture/needs a mri         Allergies   Allergen Reactions    Alendronate Sodium         Current Outpatient Medications   Medication Sig Dispense Refill    HYDROcodone-acetaminophen (NORCO) 5-325 MG per tablet Take 1 tablet by mouth every 6 hours as needed for Pain for up to 5 days. Intended supply: 5 days. Take lowest dose possible to manage pain 20 tablet 0     No current facility-administered medications for this visit. HPI: Patient comes in for follow-up visit. She was in the ER on 9/12/2021 due to severe back pain after a fall. CAT scan of the spine revealed a partial compression fracture of T10 and old compression with evidence of vertebroplasty T7. Family would like her to be seen by Dr. Karyn Marte, spine surgeon for possible vertebroplasty. He requires an MRI prior to consultation. She has been having a lot of back pain and she was given a small supply of Norco from the emergency room and family is requesting another prescription for that for her to take as needed. She has a history of Alzheimer's dementia and has been very restless and somewhat agitated at times. Family states that she is uncooperative with taking her usual medications at home but she has been taking the pain pill which does give her some temporary relief. Review of Systems: as per HPI      Physical Exam:    Patient is a 80 y.o. female. Patient appears to be in no distress. She does seem to be in pain and is somewhat restless. Breathing comfortably. Ambulates without assistance. HEENT: normal.  Neck supple, no adenopathy or bruits. Heart RR, no MGR. Lungs clear. Abd: normal  Ext: no edema. Peripheral pulses: normal.  No neurologic deficits noted. Assessment:    Brenda was seen today for follow-up.     Diagnoses and all orders for this visit:    Closed compression fracture of thoracic vertebra with routine healing, subsequent encounter    Age-related osteoporosis without current pathological fracture    MCI (mild cognitive impairment)    Other orders  -     MRI THORACIC SPINE WO CONTRAST; Future  -     HYDROcodone-acetaminophen (NORCO) 5-325 MG per tablet; Take 1 tablet by mouth every 6 hours as needed for Pain for up to 5 days. Intended supply: 5 days. Take lowest dose possible to manage pain          Discussion Notes: We will order MRI of the thoracic spine and following that refer to Dr. Carli Beltrán for consultation for possible vertebroplasty. Also will try to get her set up for Prolia injections through the infusion center for her osteoporosis which was documented on a DEXA scan done a couple of years ago. Because of her dementia she is unable or unwilling to take the oral medication for osteoporosis. Also will send a prescription for [place] to her pharmacy for her to take as needed for pain. Family will keep her under close supervision.

## 2021-09-15 ENCOUNTER — TELEPHONE (OUTPATIENT)
Dept: PRIMARY CARE CLINIC | Age: 81
End: 2021-09-15

## 2021-09-15 ENCOUNTER — OFFICE VISIT (OUTPATIENT)
Dept: NEUROSURGERY | Age: 81
End: 2021-09-15
Payer: MEDICARE

## 2021-09-15 VITALS
TEMPERATURE: 98.1 F | SYSTOLIC BLOOD PRESSURE: 109 MMHG | WEIGHT: 140 LBS | OXYGEN SATURATION: 95 % | RESPIRATION RATE: 16 BRPM | HEIGHT: 58 IN | DIASTOLIC BLOOD PRESSURE: 58 MMHG | BODY MASS INDEX: 29.39 KG/M2 | HEART RATE: 101 BPM

## 2021-09-15 DIAGNOSIS — S22.070A CLOSED WEDGE COMPRESSION FRACTURE OF T10 VERTEBRA, INITIAL ENCOUNTER (HCC): Primary | ICD-10-CM

## 2021-09-15 DIAGNOSIS — M80.00XA AGE-RELATED OSTEOPOROSIS WITH CURRENT PATHOLOGICAL FRACTURE, INITIAL ENCOUNTER: ICD-10-CM

## 2021-09-15 DIAGNOSIS — E78.2 MIXED HYPERLIPIDEMIA: Primary | ICD-10-CM

## 2021-09-15 DIAGNOSIS — M81.0 AGE-RELATED OSTEOPOROSIS WITHOUT CURRENT PATHOLOGICAL FRACTURE: ICD-10-CM

## 2021-09-15 PROCEDURE — G8417 CALC BMI ABV UP PARAM F/U: HCPCS | Performed by: PHYSICIAN ASSISTANT

## 2021-09-15 PROCEDURE — 1036F TOBACCO NON-USER: CPT | Performed by: PHYSICIAN ASSISTANT

## 2021-09-15 PROCEDURE — G8427 DOCREV CUR MEDS BY ELIG CLIN: HCPCS | Performed by: PHYSICIAN ASSISTANT

## 2021-09-15 PROCEDURE — 99203 OFFICE O/P NEW LOW 30 MIN: CPT | Performed by: PHYSICIAN ASSISTANT

## 2021-09-15 PROCEDURE — 4040F PNEUMOC VAC/ADMIN/RCVD: CPT | Performed by: PHYSICIAN ASSISTANT

## 2021-09-15 PROCEDURE — 1090F PRES/ABSN URINE INCON ASSESS: CPT | Performed by: PHYSICIAN ASSISTANT

## 2021-09-15 PROCEDURE — G8399 PT W/DXA RESULTS DOCUMENT: HCPCS | Performed by: PHYSICIAN ASSISTANT

## 2021-09-15 PROCEDURE — 1123F ACP DISCUSS/DSCN MKR DOCD: CPT | Performed by: PHYSICIAN ASSISTANT

## 2021-09-15 ASSESSMENT — ENCOUNTER SYMPTOMS
SHORTNESS OF BREATH: 0
PHOTOPHOBIA: 0
ABDOMINAL PAIN: 0
BACK PAIN: 1
TROUBLE SWALLOWING: 0

## 2021-09-15 NOTE — TELEPHONE ENCOUNTER
Apparently she is going to see someone in Dr. Adarsh Dominguez office today. For her constipation she should be taking a stool softener such as Colace once or twice a day and milk of magnesia as needed. If that is not effective she can take some magnesium citrate over-the-counter. Try to stay adequately hydrated.

## 2021-09-15 NOTE — TELEPHONE ENCOUNTER
Pt son calling his mother is having persistent pain and can not get an mri until sept 25 or 25th does not believe she can wait that long.  She is now also having constipation hard time going to the bathroom  advise

## 2021-09-15 NOTE — PROGRESS NOTES
Status: She is alert. Comments: Alert and oriented x3  Moving all extremities well  Sensation intact to LT   Psychiatric:      Comments: Hx dementia         Assessment: This is an 80year old female presenting for back pain. Age indeterminate T10 compression fx on CT scan. Plan:      -MRI thoracic spine ordered by PCP, scheduled for 9/22. Son is concerned pt is unable to lay flat for extended amount of time.  NM bone scan ordered  -Continue pain medication PRN  -Call/return to Neurosurgery clinic after completion of imaging to discuss results and further treatment plan  -Call/return sooner if symptoms worsen or new issues arise in the interim           Анна Espitia PA-C

## 2021-09-21 ENCOUNTER — HOSPITAL ENCOUNTER (OUTPATIENT)
Dept: NUCLEAR MEDICINE | Age: 81
Discharge: HOME | End: 2021-09-21
Payer: MEDICARE

## 2021-09-21 ENCOUNTER — TELEPHONE (OUTPATIENT)
Dept: NEUROSURGERY | Age: 81
End: 2021-09-21

## 2021-09-21 DIAGNOSIS — M80.00XA AGE-RELATED OSTEOPOROSIS WITH CURRENT PATHOLOGICAL FRACTURE, INITIAL ENCOUNTER: ICD-10-CM

## 2021-09-21 DIAGNOSIS — S22.070A CLOSED WEDGE COMPRESSION FRACTURE OF T10 VERTEBRA, INITIAL ENCOUNTER (HCC): Primary | ICD-10-CM

## 2021-09-21 DIAGNOSIS — S22.070A CLOSED WEDGE COMPRESSION FRACTURE OF T10 VERTEBRA, INITIAL ENCOUNTER (HCC): ICD-10-CM

## 2021-09-21 PROCEDURE — 78306 BONE IMAGING WHOLE BODY: CPT

## 2021-09-21 PROCEDURE — A9503 TC99M MEDRONATE: HCPCS | Performed by: RADIOLOGY

## 2021-09-21 PROCEDURE — 3430000000 HC RX DIAGNOSTIC RADIOPHARMACEUTICAL: Performed by: RADIOLOGY

## 2021-09-21 RX ORDER — TC 99M MEDRONATE 20 MG/10ML
25 INJECTION, POWDER, LYOPHILIZED, FOR SOLUTION INTRAVENOUS
Status: COMPLETED | OUTPATIENT
Start: 2021-09-21 | End: 2021-09-21

## 2021-09-21 RX ORDER — TRAMADOL HYDROCHLORIDE 50 MG/1
50 TABLET ORAL EVERY 4 HOURS PRN
Qty: 42 TABLET | Refills: 0 | Status: SHIPPED | OUTPATIENT
Start: 2021-09-21 | End: 2021-09-28

## 2021-09-21 RX ADMIN — TC 99M MEDRONATE 25 MILLICURIE: 20 INJECTION, POWDER, LYOPHILIZED, FOR SOLUTION INTRAVENOUS at 08:24

## 2021-09-21 NOTE — TELEPHONE ENCOUNTER
Called Kleber, patient's son, and discussed bone scan. Acute T10 compression fracture noted. Acute sacral fracture also noted. They would like to make an appointment with Dr. Diego Haddad to discuss kyphoplasty. Norco seems to be too strong, Tramadol sent to pharmacy. Will call with appointment date and time.

## 2021-09-23 ENCOUNTER — OFFICE VISIT (OUTPATIENT)
Dept: NEUROSURGERY | Age: 81
End: 2021-09-23
Payer: MEDICARE

## 2021-09-23 VITALS
WEIGHT: 140 LBS | HEIGHT: 58 IN | DIASTOLIC BLOOD PRESSURE: 84 MMHG | HEART RATE: 89 BPM | OXYGEN SATURATION: 98 % | RESPIRATION RATE: 18 BRPM | SYSTOLIC BLOOD PRESSURE: 145 MMHG | TEMPERATURE: 98.4 F | BODY MASS INDEX: 29.39 KG/M2

## 2021-09-23 DIAGNOSIS — M54.40 LOW BACK PAIN WITH SCIATICA, SCIATICA LATERALITY UNSPECIFIED, UNSPECIFIED BACK PAIN LATERALITY, UNSPECIFIED CHRONICITY: Primary | ICD-10-CM

## 2021-09-23 PROCEDURE — 1090F PRES/ABSN URINE INCON ASSESS: CPT | Performed by: NEUROLOGICAL SURGERY

## 2021-09-23 PROCEDURE — 4040F PNEUMOC VAC/ADMIN/RCVD: CPT | Performed by: NEUROLOGICAL SURGERY

## 2021-09-23 PROCEDURE — 1123F ACP DISCUSS/DSCN MKR DOCD: CPT | Performed by: NEUROLOGICAL SURGERY

## 2021-09-23 PROCEDURE — 1036F TOBACCO NON-USER: CPT | Performed by: NEUROLOGICAL SURGERY

## 2021-09-23 PROCEDURE — G8417 CALC BMI ABV UP PARAM F/U: HCPCS | Performed by: NEUROLOGICAL SURGERY

## 2021-09-23 PROCEDURE — G8399 PT W/DXA RESULTS DOCUMENT: HCPCS | Performed by: NEUROLOGICAL SURGERY

## 2021-09-23 PROCEDURE — 99213 OFFICE O/P EST LOW 20 MIN: CPT | Performed by: NEUROLOGICAL SURGERY

## 2021-09-23 PROCEDURE — G8427 DOCREV CUR MEDS BY ELIG CLIN: HCPCS | Performed by: NEUROLOGICAL SURGERY

## 2021-09-24 ENCOUNTER — PREP FOR PROCEDURE (OUTPATIENT)
Dept: NEUROSURGERY | Age: 81
End: 2021-09-24

## 2021-09-24 RX ORDER — SODIUM CHLORIDE 9 MG/ML
INJECTION, SOLUTION INTRAVENOUS CONTINUOUS
Status: CANCELLED | OUTPATIENT
Start: 2021-09-24

## 2021-09-24 RX ORDER — SODIUM CHLORIDE 0.9 % (FLUSH) 0.9 %
10 SYRINGE (ML) INJECTION EVERY 12 HOURS SCHEDULED
Status: CANCELLED | OUTPATIENT
Start: 2021-09-24

## 2021-09-24 RX ORDER — SODIUM CHLORIDE 0.9 % (FLUSH) 0.9 %
10 SYRINGE (ML) INJECTION PRN
Status: CANCELLED | OUTPATIENT
Start: 2021-09-24

## 2021-09-24 RX ORDER — SODIUM CHLORIDE 9 MG/ML
25 INJECTION, SOLUTION INTRAVENOUS PRN
Status: CANCELLED | OUTPATIENT
Start: 2021-09-24

## 2021-09-27 NOTE — PROGRESS NOTES
[address] PRE-ADMISSION TESTING GENERAL INSTRUCTIONS- [facility]-phone number:[phone]    GENERAL INSTRUCTIONS  [x] Antibacterial Soap shower Night before and/or AM of Surgery    [x] Nothing by mouth after midnight, including gum, candy, mints, or water. [x] You may brush your teeth, gargle, but do NOT swallow water. [x]No smoking, chewing tobacco, illegal drugs, or alcohol within 24 hours of your surgery. [x] Jewelry, valuables or body piercing's should not be brought to the hospital. All body and/or tongue piercing's must be removed prior to arriving to hospital.  ALL hair pins must be removed. [] Do not wear makeup, lotions, powders, deodorant. Nail polish as directed by the nurse. [x] Arrange transportation with a responsible adult  to and from the hospital. If you do not have a responsible adult  to transport you, you will need to make arrangements with a medical transportation company (i.e. [employer]. A Uber/taxi/bus is not appropriate unless you are accompanied by a responsible adult ). Arrange for someone to be with you for the remainder of the day and for 24 hours after your procedure due to having had anesthesia. Who will be your  for transportation? Whit Champion, son or Kleber, son they are aware only on person may accompany patient  Who will be staying with you for 24 hrs after your procedure? Whit Champion, son or Kleber, son   [x] Bring insurance card and photo ID. [x] Bring copy of living will or healthcare power of  papers to be placed in your electronic record. PARKING INSTRUCTIONS:   [x] Arrival Time: 1200, you and your  will need to wear a mask. · [x] Parking lot '\"I\"  is located on [facility] (the corner of [facility]). To enter, press the button and the gate will lift. A free token will be provided to exit the lot. One car per patient is allowed to park in this lot.  All other cars are to park on [address] either in the parking garage or the [employer] lot. Walk up the front walk to the [facility], the door will be locked an employee will greet you and let you in. EDUCATION INSTRUCTIONS:          [x]Pain: Post-op pain is normal and to be expected. You will be asked to rate your pain from 0-10 (a zero is not acceptable-education is needed). Your post-op pain goal is:   [x] Ask your nurse for your pain medication. MEDICATION INSTRUCTIONS:  [x]Bring a complete list of your medications, please write the last time you took the medicine, give this list to the nurse. [x] Take the following medications the morning of surgery with 1-2 ounces of water: May take tramadol the morning of surgery if needed with a sip of water. [x] Stop herbal supplements, ibuprofen (Nsaids)  and vitamins 5 days before your surgery. [x] Follow physician instructions regarding any blood thinners you may be taking. WHAT TO EXPECT:  [x] The day of surgery you will be greeted and checked in by the [employer]. Please bring your photo ID and insurance card. A nurse will greet you in accordance to the time you are needed in the pre-op area to prepare you for surgery. Please do not be discouraged if you are not greeted in the order you arrive as there are many variables that are involved in patient preparation. Your patience is greatly appreciated as you wait for your nurse. Please bring in items such as: books, magazines, newspapers, electronics, or any other items  to occupy your time in the waiting area. [x]  Delays may occur with surgery and staff will make a sincere effort to keep you informed of delays. If any delays occur with your procedure, we apologize ahead of time for your inconvenience as we recognize the value of your time.

## 2021-09-29 NOTE — H&P
Ainsley Crain is an 80year old female with a past medical history of HTN, HLD, Alzheimer dementia, osteoporosis, and hx T7 kypoplasty by Dr. Kale Rios in 2017. She presents to the office today c/o midline to right sided mid back/low back pain. Pt presents with her son. States that she fell about a week ago and has been having worsening back pain. Describes the pain as constant ache and stabbing pain. CT thoracic spine demonstrates previous T7 kyphoplasty and T10 compression fracture. Denies pain down the legs, numbness, tingling, change in bowel or bladder, saddle anesthesia, fever, chills, SOB, or chest pain. Past Medical History:   Diagnosis Date    Alzheimer disease ([facility] [address]     Anxiety as acute reaction to gross stress 1/11/2017    Asymptomatic bilateral carotid artery stenosis 1/12/2017    HTN, goal below 140/80 1/11/2017    STRESS ??   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    DILATION AND CURETTAGE OF UTERUS  3/20/2002    uterine fibroids x 2    FIXATION KYPHOPLASTY  01/12/2017    t7     Social History     Socioeconomic History    Marital status:       Spouse name: Not on file    Number of children: Not on file    Years of education: Not on file    Highest education level: Not on file   Occupational History    Not on file   Tobacco Use    Smoking status: Never Smoker    Smokeless tobacco: Never Used   Vaping Use    Vaping Use: Never used   Substance and Sexual Activity    Alcohol use: No    Drug use: No    Sexual activity: Not on file   Other Topics Concern    Not on file   Social History Narrative    Not on file     Social Determinants of Health     Financial Resource Strain: Low Risk     Difficulty of Paying Living Expenses: Not hard at all   Food Insecurity: No Food Insecurity    Worried About Running Out of Food in the Last Year: Never true    Ran Out of Food in the Last Year: Never true   Transportation Needs:     Lack of Transportation (Medical):  Lack of Transportation (Non-Medical):    Physical Activity:     Days of Exercise per Week:     Minutes of Exercise per Session:    Stress:     Feeling of Stress :    Social Connections:     Frequency of Communication with Friends and Family:     Frequency of Social Gatherings with Friends and Family:     Attends [employer] Services:     Active Member of Clubs or Organizations:     Attends Club or Organization Meetings:     Marital Status:    Intimate Partner Violence:     Fear of Current or Ex-Partner:     Emotionally Abused:     Physically Abused:     Sexually Abused:      History reviewed. No pertinent family history. Scheduled Meds:  Continuous Infusions:  PRN Meds:. Allergies   Allergen Reactions    Alendronate Sodium      History reviewed. No pertinent family history.            Review of Systems   Constitutional: Negative for fever. HENT: Negative for trouble swallowing. Eyes: Negative for photophobia. Respiratory: Negative for shortness of breath. Cardiovascular: Negative for chest pain. Gastrointestinal: Negative for abdominal pain. Endocrine: Negative for heat intolerance. Genitourinary: Negative for flank pain. Musculoskeletal: Positive for arthralgias and back pain. Skin: Negative for wound. Neurological: Negative for numbness and headaches. Psychiatric/Behavioral: Negative for confusion.         Objective:   Physical Exam  Constitutional:       Appearance: Normal appearance. She is well-developed. HENT:      Head: Normocephalic. Comments: Ecchymosis noted above left eye  Eyes:      Conjunctiva/sclera: Conjunctivae normal.      Pupils: Pupils are equal, round, and reactive to light.    Cardiovascular: Rate and Rhythm: Normal rate. Pulmonary:      Effort: Pulmonary effort is normal.   Abdominal:      General: There is no distension. Musculoskeletal:      Cervical back: Normal range of motion and neck supple. Comments: Moderate tenderness to palpation of midline to right sided thoracic and lumbar spine   Skin:     General: Skin is warm and dry. Neurological:      Mental Status: She is alert. Comments: Alert and oriented x3  Moving all extremities well  Sensation intact to LT   Psychiatric:      Comments: Hx dementia            Assessment: This is an 80year old female presenting for back pain. She has an acute T10 compression fx on CT scan. Plan:   I am recommending a T10 kyphoplasty. R/B/A of surgery have been discussed and she wishes to proceed.

## 2021-09-30 ENCOUNTER — ANESTHESIA EVENT (OUTPATIENT)
Dept: OPERATING ROOM | Age: 81
End: 2021-09-30
Payer: MEDICARE

## 2021-09-30 ENCOUNTER — APPOINTMENT (OUTPATIENT)
Dept: GENERAL RADIOLOGY | Age: 81
End: 2021-09-30
Attending: NEUROLOGICAL SURGERY
Payer: MEDICARE

## 2021-09-30 ENCOUNTER — HOSPITAL ENCOUNTER (OUTPATIENT)
Age: 81
Setting detail: OUTPATIENT SURGERY
Discharge: HOME | End: 2021-09-30
Attending: NEUROLOGICAL SURGERY | Admitting: NEUROLOGICAL SURGERY
Payer: MEDICARE

## 2021-09-30 ENCOUNTER — ANESTHESIA (OUTPATIENT)
Dept: OPERATING ROOM | Age: 81
End: 2021-09-30
Payer: MEDICARE

## 2021-09-30 VITALS
DIASTOLIC BLOOD PRESSURE: 59 MMHG | HEIGHT: 58 IN | BODY MASS INDEX: 29.39 KG/M2 | TEMPERATURE: 97.9 F | WEIGHT: 140 LBS | OXYGEN SATURATION: 99 % | SYSTOLIC BLOOD PRESSURE: 145 MMHG | HEART RATE: 90 BPM | RESPIRATION RATE: 16 BRPM

## 2021-09-30 VITALS — DIASTOLIC BLOOD PRESSURE: 66 MMHG | SYSTOLIC BLOOD PRESSURE: 105 MMHG | OXYGEN SATURATION: 90 %

## 2021-09-30 DIAGNOSIS — S22.000A CLOSED COMPRESSION FRACTURE OF THORACIC VERTEBRA, INITIAL ENCOUNTER (HCC): Primary | ICD-10-CM

## 2021-09-30 PROCEDURE — 7100000011 HC PHASE II RECOVERY - ADDTL 15 MIN: Performed by: NEUROLOGICAL SURGERY

## 2021-09-30 PROCEDURE — 2580000003 HC RX 258

## 2021-09-30 PROCEDURE — 7100000010 HC PHASE II RECOVERY - FIRST 15 MIN: Performed by: NEUROLOGICAL SURGERY

## 2021-09-30 PROCEDURE — 6360000002 HC RX W HCPCS

## 2021-09-30 PROCEDURE — 2500000003 HC RX 250 WO HCPCS: Performed by: NEUROLOGICAL SURGERY

## 2021-09-30 PROCEDURE — 3700000000 HC ANESTHESIA ATTENDED CARE: Performed by: NEUROLOGICAL SURGERY

## 2021-09-30 PROCEDURE — C1894 INTRO/SHEATH, NON-LASER: HCPCS | Performed by: NEUROLOGICAL SURGERY

## 2021-09-30 PROCEDURE — 3600000004 HC SURGERY LEVEL 4 BASE: Performed by: NEUROLOGICAL SURGERY

## 2021-09-30 PROCEDURE — 3209999900 FLUORO FOR SURGICAL PROCEDURES

## 2021-09-30 PROCEDURE — 2580000003 HC RX 258: Performed by: NURSE ANESTHETIST, CERTIFIED REGISTERED

## 2021-09-30 PROCEDURE — 2720000010 HC SURG SUPPLY STERILE: Performed by: NEUROLOGICAL SURGERY

## 2021-09-30 PROCEDURE — 88307 TISSUE EXAM BY PATHOLOGIST: CPT

## 2021-09-30 PROCEDURE — 2709999900 HC NON-CHARGEABLE SUPPLY: Performed by: NEUROLOGICAL SURGERY

## 2021-09-30 PROCEDURE — C1713 ANCHOR/SCREW BN/BN,TIS/BN: HCPCS | Performed by: NEUROLOGICAL SURGERY

## 2021-09-30 PROCEDURE — 3700000001 HC ADD 15 MINUTES (ANESTHESIA): Performed by: NEUROLOGICAL SURGERY

## 2021-09-30 PROCEDURE — 88311 DECALCIFY TISSUE: CPT

## 2021-09-30 PROCEDURE — 22513 PERQ VERTEBRAL AUGMENTATION: CPT | Performed by: NEUROLOGICAL SURGERY

## 2021-09-30 PROCEDURE — 6360000002 HC RX W HCPCS: Performed by: NURSE ANESTHETIST, CERTIFIED REGISTERED

## 2021-09-30 PROCEDURE — 3600000014 HC SURGERY LEVEL 4 ADDTL 15MIN: Performed by: NEUROLOGICAL SURGERY

## 2021-09-30 DEVICE — BONE CEMENT C01B HV-R WITH MIXER  US
Type: IMPLANTABLE DEVICE | Site: BACK | Status: FUNCTIONAL
Brand: KYPHON® HV-R® BONE CEMENT AND KYPHON® MIXER PACK

## 2021-09-30 RX ORDER — SODIUM CHLORIDE 9 MG/ML
INJECTION, SOLUTION INTRAVENOUS CONTINUOUS PRN
Status: DISCONTINUED | OUTPATIENT
Start: 2021-09-30 | End: 2021-09-30 | Stop reason: SDUPTHER

## 2021-09-30 RX ORDER — SODIUM CHLORIDE 0.9 % (FLUSH) 0.9 %
10 SYRINGE (ML) INJECTION PRN
Status: DISCONTINUED | OUTPATIENT
Start: 2021-09-30 | End: 2021-09-30 | Stop reason: HOSPADM

## 2021-09-30 RX ORDER — FENTANYL CITRATE 50 UG/ML
INJECTION, SOLUTION INTRAMUSCULAR; INTRAVENOUS PRN
Status: DISCONTINUED | OUTPATIENT
Start: 2021-09-30 | End: 2021-09-30 | Stop reason: SDUPTHER

## 2021-09-30 RX ORDER — LIDOCAINE HYDROCHLORIDE AND EPINEPHRINE 10; 10 MG/ML; UG/ML
INJECTION, SOLUTION INFILTRATION; PERINEURAL PRN
Status: DISCONTINUED | OUTPATIENT
Start: 2021-09-30 | End: 2021-09-30 | Stop reason: ALTCHOICE

## 2021-09-30 RX ORDER — SODIUM CHLORIDE 0.9 % (FLUSH) 0.9 %
10 SYRINGE (ML) INJECTION EVERY 12 HOURS SCHEDULED
Status: DISCONTINUED | OUTPATIENT
Start: 2021-09-30 | End: 2021-09-30 | Stop reason: HOSPADM

## 2021-09-30 RX ORDER — SODIUM CHLORIDE 9 MG/ML
25 INJECTION, SOLUTION INTRAVENOUS PRN
Status: DISCONTINUED | OUTPATIENT
Start: 2021-09-30 | End: 2021-09-30 | Stop reason: HOSPADM

## 2021-09-30 RX ORDER — BUPIVACAINE HYDROCHLORIDE 2.5 MG/ML
INJECTION, SOLUTION EPIDURAL; INFILTRATION; INTRACAUDAL PRN
Status: DISCONTINUED | OUTPATIENT
Start: 2021-09-30 | End: 2021-09-30 | Stop reason: ALTCHOICE

## 2021-09-30 RX ORDER — PROPOFOL 10 MG/ML
INJECTION, EMULSION INTRAVENOUS CONTINUOUS PRN
Status: DISCONTINUED | OUTPATIENT
Start: 2021-09-30 | End: 2021-09-30 | Stop reason: SDUPTHER

## 2021-09-30 RX ORDER — SODIUM CHLORIDE 9 MG/ML
INJECTION, SOLUTION INTRAVENOUS CONTINUOUS
Status: DISCONTINUED | OUTPATIENT
Start: 2021-09-30 | End: 2021-09-30 | Stop reason: HOSPADM

## 2021-09-30 RX ORDER — TRAMADOL HYDROCHLORIDE 50 MG/1
50 TABLET ORAL EVERY 4 HOURS PRN
Qty: 42 TABLET | Refills: 0 | Status: SHIPPED | OUTPATIENT
Start: 2021-09-30 | End: 2021-10-07

## 2021-09-30 RX ADMIN — SODIUM CHLORIDE: 9 INJECTION, SOLUTION INTRAVENOUS at 13:11

## 2021-09-30 RX ADMIN — SODIUM CHLORIDE: 9 INJECTION, SOLUTION INTRAVENOUS at 11:45

## 2021-09-30 RX ADMIN — FENTANYL CITRATE 25 MCG: 50 INJECTION, SOLUTION INTRAMUSCULAR; INTRAVENOUS at 13:12

## 2021-09-30 RX ADMIN — PROPOFOL 65.62 MCG/KG/MIN: 10 INJECTION, EMULSION INTRAVENOUS at 13:19

## 2021-09-30 RX ADMIN — FENTANYL CITRATE 25 MCG: 50 INJECTION, SOLUTION INTRAMUSCULAR; INTRAVENOUS at 13:40

## 2021-09-30 RX ADMIN — Medication 2000 MG: at 13:27

## 2021-09-30 RX ADMIN — FENTANYL CITRATE 25 MCG: 50 INJECTION, SOLUTION INTRAMUSCULAR; INTRAVENOUS at 13:29

## 2021-09-30 ASSESSMENT — PULMONARY FUNCTION TESTS
PIF_VALUE: 0
PIF_VALUE: 1
PIF_VALUE: 0
PIF_VALUE: 1
PIF_VALUE: 0
PIF_VALUE: 0
PIF_VALUE: 1
PIF_VALUE: 0
PIF_VALUE: 1
PIF_VALUE: 0
PIF_VALUE: 1
PIF_VALUE: 0
PIF_VALUE: 0
PIF_VALUE: 1
PIF_VALUE: 0
PIF_VALUE: 1
PIF_VALUE: 0
PIF_VALUE: 1
PIF_VALUE: 1
PIF_VALUE: 0
PIF_VALUE: 1

## 2021-09-30 ASSESSMENT — PAIN SCALES - GENERAL
PAINLEVEL_OUTOF10: 0

## 2021-09-30 ASSESSMENT — PAIN - FUNCTIONAL ASSESSMENT: PAIN_FUNCTIONAL_ASSESSMENT: 0-10

## 2021-09-30 NOTE — ANESTHESIA PRE PROCEDURE
Department of Anesthesiology  Preprocedure Note       Name:  Trina Adams   Age:  80 y.o.  :  1940                                          MRN:  [id number]         Date:  2021      Surgeon: Michelle Fulton):  Marco Antonio Boone MD    Procedure: Procedure(s):  T10 KYPHOPLASTY---C ARM--MEDTRONIC    Medications prior to admission:   Prior to Admission medications    Not on File       Current medications:    Current Facility-Administered Medications   Medication Dose Route Frequency Provider Last Rate Last Admin    0.9 % sodium chloride infusion   IntraVENous Continuous JERZY Lowry 100 mL/hr at 21 1145 New Bag at 21 1145    0.9 % sodium chloride infusion  25 mL IntraVENous PRN JERZY Lowry        ceFAZolin (ANCEF) 2000 mg in sterile water 20 mL IV syringe  2,000 mg IntraVENous On Call to [address]        sodium chloride flush 0.9 % injection 10 mL  10 mL IntraVENous 2 times per day JERZY Lowry        sodium chloride flush 0.9 % injection 10 mL  10 mL IntraVENous PRN JERZY Lowry           Allergies:     Allergies   Allergen Reactions    Alendronate Sodium        Problem List:    Patient Active Problem List   Diagnosis Code    Personal history of breast cancer Z85.3    Closed compression fracture of thoracic vertebra ([facility] [address] S22.000A    Osteoporosis M81.0    Asymptomatic bilateral carotid artery stenosis I65.23    Essential hypertension I10    MCI (mild cognitive impairment) G31.84       Past Medical History:        Diagnosis Date    Alzheimer disease ([facility] [address]     Anxiety as acute reaction to gross stress 2017    Asymptomatic bilateral carotid artery stenosis 2017    HTN, goal below 140/80 2017    STRESS ??   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    DILATION AND CURETTAGE OF UTERUS  3/20/2002    uterine fibroids x 2    FIXATION KYPHOPLASTY  01/12/2017    t7       Social History:    Social History     Tobacco Use    Smoking status: Never Smoker    Smokeless tobacco: Never Used   Substance Use Topics    Alcohol use: No                                Counseling given: Not Answered      Vital Signs (Current):   Vitals:    09/27/21 0926 09/30/21 1126   BP:  (!) 151/65   Pulse:  104   Resp:  20   Temp:  (!) 0.9 °C (33.6 °F)   TempSrc:  Temporal   SpO2:  97%   Weight: 140 lb (63.5 kg) 140 lb (63.5 kg)   Height: 4' 10\" (1.473 m) 4' 10\" (1.473 m)                                              BP Readings from Last 3 Encounters:   09/30/21 (!) 151/65   09/23/21 (!) 145/84   09/15/21 (!) 109/58       NPO Status: Time of last liquid consumption: 1700                        Time of last solid consumption: 1700                        Date of last liquid consumption: 09/29/21                        Date of last solid food consumption: 09/29/21    BMI:   Wt Readings from Last 3 Encounters:   09/30/21 140 lb (63.5 kg)   09/23/21 140 lb (63.5 kg)   09/15/21 140 lb (63.5 kg)     Body mass index is 29.26 kg/m².     CBC:   Lab Results   Component Value Date    WBC 3.5 01/10/2017    RBC 3.76 01/10/2017    HGB 12.0 01/10/2017    HCT 36.2 01/10/2017    MCV 96.3 01/10/2017    RDW 14.0 01/10/2017     01/10/2017       CMP:   Lab Results   Component Value Date     01/10/2017    K 4.4 01/10/2017     01/10/2017    CO2 26 01/10/2017    BUN 16 01/10/2017    CREATININE 0.7 01/10/2017    GFRAA >60 01/10/2017    LABGLOM >60 01/10/2017    GLUCOSE 93 01/10/2017    PROT 6.1 01/10/2017    CALCIUM 8.9 01/10/2017    BILITOT 0.3 01/10/2017    ALKPHOS 52 01/10/2017    AST 16 01/10/2017    ALT 16 01/10/2017       POC Tests: No results for input(s): POCGLU, POCNA, POCK, POCCL, POCBUN, POCHEMO, POCHCT in the last 72 hours. Coags:   Lab Results   Component Value Date    PROTIME 11.9 01/09/2017    INR 1.1 01/09/2017    APTT 26.1 01/09/2017       HCG (If Applicable): No results found for: PREGTESTUR, PREGSERUM, HCG, HCGQUANT     ABGs: No results found for: PHART, PO2ART, VYR1NMD, QAB3MIN, BEART, V2RAWFWW     Type & Screen (If Applicable):  No results found for: LABABO, LABRH    Drug/Infectious Status (If Applicable):  No results found for: HIV, HEPCAB    COVID-19 Screening (If Applicable): No results found for: COVID19    ECHO 1/11/17   Summary   Left ventricle grossly normal in size. Proximal septal thickening. Normal LV segmental wall motion. Estimated left ventricular ejection fraction is 65-70 %. Diastolic function normal as LAESV Index is <29 ml/m2. Mildly dilated right ventricle. Right ventricular segmental wall motion is normal.   Mild mitral regurgitation is present. Physiologic and/or trace tricuspid regurgitation. RVSP is 22 mmHg. Physiologic and/or trace pulmonic regurgitation present. Technically fair quality study. No comparison study available. Suggest clinical correlation. Carotid US 7/25/17  Impression:       Right internal carotid artery: 0 - 40% stenosis.       Left internal carotid artery: 0 - 40% stenosis.          Anesthesia Evaluation  Patient summary reviewed no history of anesthetic complications:   Airway: Mallampati: III  TM distance: <3 FB   Neck ROM: full  Mouth opening: > = 3 FB Dental:          Pulmonary:Negative Pulmonary ROS breath sounds clear to auscultation                             Cardiovascular:  Exercise tolerance: good (>4 METS),   (+) hypertension:, hyperlipidemia    (-)  PAGAN      Rhythm: regular  Rate: normal                    Neuro/Psych:   (+) psychiatric history (Alzheimer disease ):depression/anxiety              ROS comment: MCI (mild cognitive impairment) GI/Hepatic/Renal: Neg GI/Hepatic/Renal ROS            Endo/Other:    (+) : arthritis (osteoporosis):., malignancy/cancer (Personal history of breast cancer). Abdominal:             Vascular:           ROS comment: Asymptomatic bilateral carotid artery stenosis. Other Findings:           Anesthesia Plan      MAC     ASA 3                                 Reggie Barakat RN   9/30/2021      Pt seen, examined, chart reviewed, plan discussed.   Fernie Rogers MD  9/30/2021  2:46 PM

## 2021-09-30 NOTE — H&P
I have examined the patient and reviewed the H and P and no changes are noted    Maci Rosario MD Statement Selected

## 2021-09-30 NOTE — ANESTHESIA POSTPROCEDURE EVALUATION
Department of Anesthesiology  Postprocedure Note    Patient: Meseret Vila  MRN: [id number]  YOB: 1940  Date of evaluation: 9/30/2021  Time:  2:46 PM     Procedure Summary     Date: 09/30/21 Room / Location: [address] [place] [address] 20 / [facility]    Anesthesia Start: 6162 Anesthesia Stop: 0017    Procedure: T10 KYPHOPLASTY---C ARM--MEDTRONIC (N/A Back) Diagnosis: (COMPRESSION FRACTURE)    Surgeons: David Arreola MD Responsible Provider: Shivam Murdock MD    Anesthesia Type: MAC ASA Status: 3          Anesthesia Type: MAC    Eulalia Phase I: Eulalia Score: 10    Eulalia Phase II: Eulalia Score: 10    Last vitals: Reviewed and per EMR flowsheets.        Anesthesia Post Evaluation    Patient location during evaluation: PACU  Patient participation: complete - patient participated  Level of consciousness: awake  Pain score: 3  Airway patency: patent  Nausea & Vomiting: no nausea and no vomiting  Complications: no  Cardiovascular status: blood pressure returned to baseline  Respiratory status: acceptable  Hydration status: euvolemic

## 2021-09-30 NOTE — PROGRESS NOTES
CLINICAL PHARMACY NOTE: MEDS TO BEDS    Total # of Prescriptions Filled: 1   The following medications were delivered to the patient:  · Tramadol 50 mg    Additional Documentation:  Delivered [email or web address]

## 2021-09-30 NOTE — PROGRESS NOTES
Discharge instructions given and reviewed with patient. Patient &son--- verbalizes understanding, no questions regarding care.

## 2021-09-30 NOTE — BRIEF OP NOTE
Brief Postoperative Note      Patient: Lewanda Bumpers  YOB: 1940  MRN: [id number]    Date of Procedure: 9/30/2021    Pre-Op Diagnosis: COMPRESSION FRACTURE    Post-Op Diagnosis: Same       Procedure(s):  T10 KYPHOPLASTY---C ARM--MEDTRONIC    Surgeon(s):  Asher Schirmer, MD    Assistant:  Resident: Alondra Guerrero DO    Anesthesia: Monitor Anesthesia Care    Estimated Blood Loss (mL): Minimal    Complications: None    Specimens:   ID Type Source Tests Collected by Time Destination   A : T10 BONE BIOPSY Bone Biopsy SURGICAL PATHOLOGY Asher Schirmer, MD 9/30/2021 1322        Implants:  Implant Name Type Inv.  Item Serial No.  Lot No. LRB No. Used Action   KIT CEMENT BONE COMBO KYPHON HV-R  KIT CEMENT BONE COMBO [facility] HV-R  [employer] [id number] N/A 1 Implanted         Drains: * No LDAs found *    Findings: see dictated op note    Electronically signed by Silvestre Lara MD on 9/30/2021 at 5:50 PM

## 2021-10-01 NOTE — OP NOTE
[address]                  [address] [facility]  [facility]                                OPERATIVE REPORT    PATIENT NAME: Keron Carreon                        :        1940  MED REC NO:   [id number]                            ROOM:  ACCOUNT NO:   [de-identified]                           ADMIT DATE: 2021  PROVIDER:     Vinh Zaidi MD    DATE OF PROCEDURE:  2021    PREOPERATIVE DIAGNOSIS:  Acute T10 osteoporotic compression fracture. POSTOPERATIVE DIAGNOSIS:  Acute T10 osteoporotic compression fracture. OPERATIVE PROCEDURES:  1.  Left-sided unilateral percutaneous transpedicular approach to T10  vertebral body for T10 vertebral body bone biopsy and T10 vertebral body  balloon kyphoplasty with use of Medtronic Kyphon balloon and  polymethylmethacrylate. 2.  Use of biplanar fluoroscopy interpreted by myself, the surgeon. ANESTHESIA:  Monitored anesthesia care. SURGEON:  Vinh Zaidi MD    ASSISTANT:  Maria Eugenia Willis D.O    COMPLICATIONS:  None. ESTIMATED BLOOD LOSS:  Minimal.    SPECIMEN:  Bone. OPERATIVE INDICATIONS:  The patient is an 80-year-old lady who presented  to the office complaining of excruciating back pain. She was found to  have an acute T10 osteoporotic compression fracture. She had failed  conservative therapy and after risks, benefits, and alternatives were  discussed with the patient, it was determined that she would undergo the  above-listed procedure. OPERATIVE PROCEDURE:  The patient was brought into the operating room. A time-out was performed where she was identified by her name, medical  record number and the operative procedure which she was about to  undergo. Next, induction of monitored anesthesia care was then  commenced. Upon completion of induction of monitored anesthesia care,  she received preoperative antibiotic. She was then flipped into prone  position on a Ashwin table.   All pressure points were padded. Her  thoracic region was prepped and draped in the usual sterile fashion. After this was done using biplanar fluoroscopy, I marked entry point to  the T10 pedicle on the patient's left side. I infiltrated skin with  lidocaine with epinephrine 1:200,000. I used a 15-blade to make a stab  incision. I docked the Kl[employer] One-Step Osteo introducer on the facet. I advanced it through the pedicle into the vertebral body. I was able  to get across midline. I removed the inner stylet, left the outer  working cannula in place. I inserted a bone biopsy tool. After  obtaining biopsy, I inserted Medtronic Kyphon balloon that was inflated  to 250 psi. I deflated balloon, injected a total of 4.5 mL of  polymethylmethacrylate into the vertebral body. After this was done, I  removed the outer working cannula. I obtained a final AP and lateral  fluoroscopic image. There was no evidence of extravasation of cement. Skin was closed with Dermabond. The patient was then flipped in supine  position on her hospital bed and transported to the postanesthesia care  unit in stable condition. There were no complications. Counts were  correct. I was present for the entire case.         Hayes Donahue MD    D: 09/30/2021 20:01:01       T: 09/30/2021 20:03:17     [email or web address]  Job#: [id number]     Doc#: [id number]    CC:

## 2021-10-29 ENCOUNTER — OFFICE VISIT (OUTPATIENT)
Dept: NEUROSURGERY | Age: 81
End: 2021-10-29
Payer: MEDICARE

## 2021-10-29 ENCOUNTER — HOSPITAL ENCOUNTER (OUTPATIENT)
Age: 81
Discharge: HOME | End: 2021-10-29
Payer: MEDICARE

## 2021-10-29 VITALS
DIASTOLIC BLOOD PRESSURE: 72 MMHG | SYSTOLIC BLOOD PRESSURE: 108 MMHG | BODY MASS INDEX: 29.39 KG/M2 | TEMPERATURE: 97.9 F | RESPIRATION RATE: 20 BRPM | HEIGHT: 58 IN | HEART RATE: 104 BPM | OXYGEN SATURATION: 96 % | WEIGHT: 140 LBS

## 2021-10-29 DIAGNOSIS — M81.0 AGE-RELATED OSTEOPOROSIS WITHOUT CURRENT PATHOLOGICAL FRACTURE: ICD-10-CM

## 2021-10-29 DIAGNOSIS — S22.070D CLOSED WEDGE COMPRESSION FRACTURE OF T10 VERTEBRA WITH ROUTINE HEALING, SUBSEQUENT ENCOUNTER: Primary | ICD-10-CM

## 2021-10-29 DIAGNOSIS — E78.2 MIXED HYPERLIPIDEMIA: ICD-10-CM

## 2021-10-29 LAB
ALBUMIN SERPL-MCNC: 4.7 G/DL (ref 3.5–5.2)
ALP BLD-CCNC: 86 U/L (ref 35–104)
ALT SERPL-CCNC: 16 U/L (ref 0–32)
ANION GAP SERPL CALCULATED.3IONS-SCNC: 11 MMOL/L (ref 7–16)
AST SERPL-CCNC: 18 U/L (ref 0–31)
BASOPHILS ABSOLUTE: 0.03 E9/L (ref 0–0.2)
BASOPHILS RELATIVE PERCENT: 0.7 % (ref 0–2)
BILIRUB SERPL-MCNC: 0.3 MG/DL (ref 0–1.2)
BUN BLDV-MCNC: 29 MG/DL (ref 6–23)
CALCIUM SERPL-MCNC: 10.5 MG/DL (ref 8.6–10.2)
CHLORIDE BLD-SCNC: 100 MMOL/L (ref 98–107)
CO2: 28 MMOL/L (ref 22–29)
CREAT SERPL-MCNC: 1.6 MG/DL (ref 0.5–1)
EOSINOPHILS ABSOLUTE: 0.11 E9/L (ref 0.05–0.5)
EOSINOPHILS RELATIVE PERCENT: 2.4 % (ref 0–6)
GFR AFRICAN AMERICAN: 37
GFR NON-AFRICAN AMERICAN: 31 ML/MIN/1.73
GLUCOSE BLD-MCNC: 107 MG/DL (ref 74–99)
HCT VFR BLD CALC: 42 % (ref 34–48)
HEMOGLOBIN: 13.8 G/DL (ref 11.5–15.5)
IMMATURE GRANULOCYTES #: 0.02 E9/L
IMMATURE GRANULOCYTES %: 0.4 % (ref 0–5)
LYMPHOCYTES ABSOLUTE: 1.64 E9/L (ref 1.5–4)
LYMPHOCYTES RELATIVE PERCENT: 35.6 % (ref 20–42)
MCH RBC QN AUTO: 32.2 PG (ref 26–35)
MCHC RBC AUTO-ENTMCNC: 32.9 % (ref 32–34.5)
MCV RBC AUTO: 98.1 FL (ref 80–99.9)
MONOCYTES ABSOLUTE: 0.58 E9/L (ref 0.1–0.95)
MONOCYTES RELATIVE PERCENT: 12.6 % (ref 2–12)
NEUTROPHILS ABSOLUTE: 2.23 E9/L (ref 1.8–7.3)
NEUTROPHILS RELATIVE PERCENT: 48.3 % (ref 43–80)
PDW BLD-RTO: 14.8 FL (ref 11.5–15)
PLATELET # BLD: 187 E9/L (ref 130–450)
PMV BLD AUTO: 9 FL (ref 7–12)
POTASSIUM SERPL-SCNC: 4.6 MMOL/L (ref 3.5–5)
RBC # BLD: 4.28 E12/L (ref 3.5–5.5)
SODIUM BLD-SCNC: 139 MMOL/L (ref 132–146)
TOTAL PROTEIN: 7.6 G/DL (ref 6.4–8.3)
WBC # BLD: 4.6 E9/L (ref 4.5–11.5)

## 2021-10-29 PROCEDURE — 85025 COMPLETE CBC W/AUTO DIFF WBC: CPT

## 2021-10-29 PROCEDURE — 80053 COMPREHEN METABOLIC PANEL: CPT

## 2021-10-29 PROCEDURE — 99024 POSTOP FOLLOW-UP VISIT: CPT | Performed by: PHYSICIAN ASSISTANT

## 2021-10-29 PROCEDURE — 36415 COLL VENOUS BLD VENIPUNCTURE: CPT

## 2021-10-29 NOTE — PROGRESS NOTES
Post Operative Follow-up    Patient is status post: T10 kyphoplasty. Doing well. Physical Exam  Alert and Oriented X 3  PERRLA, EOMI  BORJAS 5/5  Wound: C/D/I    A/P: patient is s/p T10 kyphoplasty. Doing well. No restrictions.   F/u PRN

## 2021-11-04 ENCOUNTER — TELEPHONE (OUTPATIENT)
Dept: PRIMARY CARE CLINIC | Age: 81
End: 2021-11-04

## 2021-11-04 DIAGNOSIS — R79.89 ELEVATED SERUM CREATININE: Primary | ICD-10-CM

## 2021-11-04 DIAGNOSIS — E83.52 HYPERCALCEMIA: ICD-10-CM

## 2021-11-04 NOTE — TELEPHONE ENCOUNTER
Pt son calling for lab results done on 10/29/21 they are interested in getting the shot for bone density rather than taking medication because she will not remember

## 2021-11-17 RX ORDER — SODIUM CHLORIDE 9 MG/ML
INJECTION, SOLUTION INTRAVENOUS CONTINUOUS
Status: CANCELLED | OUTPATIENT
Start: 2021-11-17

## 2021-11-17 RX ORDER — EPINEPHRINE 1 MG/ML
0.3 INJECTION, SOLUTION, CONCENTRATE INTRAVENOUS PRN
Status: CANCELLED | OUTPATIENT
Start: 2021-11-17

## 2021-11-17 RX ORDER — DIPHENHYDRAMINE HYDROCHLORIDE 50 MG/ML
50 INJECTION INTRAMUSCULAR; INTRAVENOUS
Status: CANCELLED | OUTPATIENT
Start: 2021-11-17

## 2022-01-31 ENCOUNTER — HOSPITAL ENCOUNTER (OUTPATIENT)
Age: 82
Discharge: HOME | End: 2022-01-31
Payer: MEDICARE

## 2022-01-31 LAB
ALBUMIN SERPL-MCNC: 4.5 G/DL (ref 3.5–5.2)
ALP BLD-CCNC: 92 U/L (ref 35–104)
ALT SERPL-CCNC: 17 U/L (ref 0–32)
ANION GAP SERPL CALCULATED.3IONS-SCNC: 16 MMOL/L (ref 7–16)
AST SERPL-CCNC: 16 U/L (ref 0–31)
BILIRUB SERPL-MCNC: <0.2 MG/DL (ref 0–1.2)
BUN BLDV-MCNC: 18 MG/DL (ref 6–23)
CALCIUM SERPL-MCNC: 9.6 MG/DL (ref 8.6–10.2)
CHLORIDE BLD-SCNC: 101 MMOL/L (ref 98–107)
CO2: 23 MMOL/L (ref 22–29)
CREAT SERPL-MCNC: 0.6 MG/DL (ref 0.5–1)
GFR AFRICAN AMERICAN: >60
GFR NON-AFRICAN AMERICAN: >60 ML/MIN/1.73
GLUCOSE BLD-MCNC: 164 MG/DL (ref 74–99)
POTASSIUM SERPL-SCNC: 3.9 MMOL/L (ref 3.5–5)
SODIUM BLD-SCNC: 140 MMOL/L (ref 132–146)
TOTAL PROTEIN: 7 G/DL (ref 6.4–8.3)

## 2022-01-31 PROCEDURE — 36415 COLL VENOUS BLD VENIPUNCTURE: CPT

## 2022-01-31 PROCEDURE — 80053 COMPREHEN METABOLIC PANEL: CPT

## 2022-02-02 ENCOUNTER — HOSPITAL ENCOUNTER (OUTPATIENT)
Dept: INFUSION THERAPY | Age: 82
Setting detail: INFUSION SERIES
Discharge: HOME | End: 2022-02-02
Payer: MEDICARE

## 2022-02-02 VITALS
WEIGHT: 130 LBS | HEART RATE: 92 BPM | SYSTOLIC BLOOD PRESSURE: 154 MMHG | DIASTOLIC BLOOD PRESSURE: 72 MMHG | HEIGHT: 60 IN | BODY MASS INDEX: 25.52 KG/M2 | OXYGEN SATURATION: 97 % | RESPIRATION RATE: 16 BRPM | TEMPERATURE: 97.9 F

## 2022-02-02 DIAGNOSIS — M80.00XD AGE-RELATED OSTEOPOROSIS WITH CURRENT PATHOLOGICAL FRACTURE WITH ROUTINE HEALING, SUBSEQUENT ENCOUNTER: Primary | ICD-10-CM

## 2022-02-02 PROCEDURE — 6360000002 HC RX W HCPCS: Performed by: INTERNAL MEDICINE

## 2022-02-02 PROCEDURE — 96372 THER/PROPH/DIAG INJ SC/IM: CPT

## 2022-02-02 RX ORDER — DIPHENHYDRAMINE HYDROCHLORIDE 50 MG/ML
50 INJECTION INTRAMUSCULAR; INTRAVENOUS
OUTPATIENT
Start: 2022-08-03

## 2022-02-02 RX ORDER — EPINEPHRINE 1 MG/ML
0.3 INJECTION, SOLUTION, CONCENTRATE INTRAVENOUS PRN
OUTPATIENT
Start: 2022-08-03

## 2022-02-02 RX ORDER — SODIUM CHLORIDE 9 MG/ML
INJECTION, SOLUTION INTRAVENOUS CONTINUOUS
OUTPATIENT
Start: 2022-08-03

## 2022-02-02 RX ADMIN — DENOSUMAB 60 MG: 60 INJECTION SUBCUTANEOUS at 14:17

## 2022-02-18 ENCOUNTER — OFFICE VISIT (OUTPATIENT)
Dept: GERIATRIC MEDICINE | Age: 82
End: 2022-02-18
Payer: MEDICARE

## 2022-02-18 VITALS
DIASTOLIC BLOOD PRESSURE: 82 MMHG | SYSTOLIC BLOOD PRESSURE: 138 MMHG | HEIGHT: 58 IN | RESPIRATION RATE: 16 BRPM | BODY MASS INDEX: 30.48 KG/M2 | TEMPERATURE: 97.5 F | WEIGHT: 145.2 LBS | HEART RATE: 84 BPM

## 2022-02-18 DIAGNOSIS — G30.9 ALZHEIMER DISEASE (HCC): Primary | ICD-10-CM

## 2022-02-18 DIAGNOSIS — F02.80 ALZHEIMER DISEASE (HCC): Primary | ICD-10-CM

## 2022-02-18 PROCEDURE — 99212 OFFICE O/P EST SF 10 MIN: CPT | Performed by: INTERNAL MEDICINE

## 2022-02-18 PROCEDURE — 1123F ACP DISCUSS/DSCN MKR DOCD: CPT | Performed by: INTERNAL MEDICINE

## 2022-02-18 PROCEDURE — G8417 CALC BMI ABV UP PARAM F/U: HCPCS | Performed by: INTERNAL MEDICINE

## 2022-02-18 PROCEDURE — 1036F TOBACCO NON-USER: CPT | Performed by: INTERNAL MEDICINE

## 2022-02-18 PROCEDURE — G8427 DOCREV CUR MEDS BY ELIG CLIN: HCPCS | Performed by: INTERNAL MEDICINE

## 2022-02-18 PROCEDURE — G8399 PT W/DXA RESULTS DOCUMENT: HCPCS | Performed by: INTERNAL MEDICINE

## 2022-02-18 PROCEDURE — G8484 FLU IMMUNIZE NO ADMIN: HCPCS | Performed by: INTERNAL MEDICINE

## 2022-02-18 PROCEDURE — 4040F PNEUMOC VAC/ADMIN/RCVD: CPT | Performed by: INTERNAL MEDICINE

## 2022-02-18 PROCEDURE — 1090F PRES/ABSN URINE INCON ASSESS: CPT | Performed by: INTERNAL MEDICINE

## 2022-02-18 SDOH — ECONOMIC STABILITY: FOOD INSECURITY: WITHIN THE PAST 12 MONTHS, YOU WORRIED THAT YOUR FOOD WOULD RUN OUT BEFORE YOU GOT MONEY TO BUY MORE.: NEVER TRUE

## 2022-02-18 SDOH — ECONOMIC STABILITY: FOOD INSECURITY: WITHIN THE PAST 12 MONTHS, THE FOOD YOU BOUGHT JUST DIDN'T LAST AND YOU DIDN'T HAVE MONEY TO GET MORE.: NEVER TRUE

## 2022-02-18 ASSESSMENT — SOCIAL DETERMINANTS OF HEALTH (SDOH): HOW HARD IS IT FOR YOU TO PAY FOR THE VERY BASICS LIKE FOOD, HOUSING, MEDICAL CARE, AND HEATING?: NOT HARD AT ALL

## 2022-02-18 NOTE — PROGRESS NOTES
Chief Complaint   Patient presents with    6 Month Follow-Up     August follow up re: dementia. Here with son, Kleber.

## 2022-02-18 NOTE — PROGRESS NOTES
CC reevaluate dementia     Here with son and  He does all IADL's  No hallucinations  Dog name Putin  Not on meds at this time   May not want to take pills   And no falls   And will not wander away  Will still do some cooking  No hallucinations  MMSE 17 and clock 3/4and same   No fires on stove   Impression; Stable Dementia                       Off meds   Plan;Continue same without meds

## 2022-07-26 ENCOUNTER — TELEPHONE (OUTPATIENT)
Dept: PRIMARY CARE CLINIC | Age: 82
End: 2022-07-26

## 2022-07-26 DIAGNOSIS — E83.52 HYPERCALCEMIA: Primary | ICD-10-CM

## 2022-08-03 ENCOUNTER — HOSPITAL ENCOUNTER (OUTPATIENT)
Age: 82
Discharge: HOME | End: 2022-08-03
Payer: MEDICARE

## 2022-08-03 DIAGNOSIS — E83.52 HYPERCALCEMIA: ICD-10-CM

## 2022-08-03 LAB
ANION GAP SERPL CALCULATED.3IONS-SCNC: 11 MMOL/L (ref 7–16)
BUN BLDV-MCNC: 22 MG/DL (ref 6–23)
CALCIUM SERPL-MCNC: 9.6 MG/DL (ref 8.6–10.2)
CHLORIDE BLD-SCNC: 101 MMOL/L (ref 98–107)
CO2: 24 MMOL/L (ref 22–29)
CREAT SERPL-MCNC: 0.6 MG/DL (ref 0.5–1)
GFR AFRICAN AMERICAN: >60
GFR NON-AFRICAN AMERICAN: >60 ML/MIN/1.73
GLUCOSE BLD-MCNC: 131 MG/DL (ref 74–99)
POTASSIUM SERPL-SCNC: 4.5 MMOL/L (ref 3.5–5)
SODIUM BLD-SCNC: 136 MMOL/L (ref 132–146)

## 2022-08-03 PROCEDURE — 80048 BASIC METABOLIC PNL TOTAL CA: CPT

## 2022-08-03 PROCEDURE — 36415 COLL VENOUS BLD VENIPUNCTURE: CPT

## 2022-08-11 ENCOUNTER — HOSPITAL ENCOUNTER (OUTPATIENT)
Dept: INFUSION THERAPY | Age: 82
Setting detail: INFUSION SERIES
Discharge: HOME | End: 2022-08-11
Payer: MEDICARE

## 2022-08-11 VITALS
RESPIRATION RATE: 18 BRPM | BODY MASS INDEX: 27.88 KG/M2 | WEIGHT: 142 LBS | HEART RATE: 92 BPM | DIASTOLIC BLOOD PRESSURE: 74 MMHG | SYSTOLIC BLOOD PRESSURE: 142 MMHG | HEIGHT: 60 IN | TEMPERATURE: 98.5 F | OXYGEN SATURATION: 96 %

## 2022-08-11 DIAGNOSIS — M80.00XD AGE-RELATED OSTEOPOROSIS WITH CURRENT PATHOLOGICAL FRACTURE WITH ROUTINE HEALING, SUBSEQUENT ENCOUNTER: Primary | ICD-10-CM

## 2022-08-11 PROCEDURE — 6360000002 HC RX W HCPCS: Performed by: INTERNAL MEDICINE

## 2022-08-11 PROCEDURE — 96372 THER/PROPH/DIAG INJ SC/IM: CPT

## 2022-08-11 RX ORDER — EPINEPHRINE 1 MG/ML
0.3 INJECTION, SOLUTION, CONCENTRATE INTRAVENOUS PRN
OUTPATIENT
Start: 2023-02-02

## 2022-08-11 RX ORDER — DIPHENHYDRAMINE HYDROCHLORIDE 50 MG/ML
50 INJECTION INTRAMUSCULAR; INTRAVENOUS
OUTPATIENT
Start: 2023-02-02

## 2022-08-11 RX ORDER — SODIUM CHLORIDE 9 MG/ML
INJECTION, SOLUTION INTRAVENOUS CONTINUOUS
OUTPATIENT
Start: 2023-02-02

## 2022-08-11 RX ADMIN — DENOSUMAB 60 MG: 60 INJECTION SUBCUTANEOUS at 15:14

## 2022-09-21 ENCOUNTER — OFFICE VISIT (OUTPATIENT)
Dept: GERIATRIC MEDICINE | Age: 82
End: 2022-09-21
Payer: MEDICARE

## 2022-09-21 VITALS
HEIGHT: 58 IN | DIASTOLIC BLOOD PRESSURE: 80 MMHG | RESPIRATION RATE: 20 BRPM | BODY MASS INDEX: 30.23 KG/M2 | WEIGHT: 144 LBS | SYSTOLIC BLOOD PRESSURE: 128 MMHG | TEMPERATURE: 97.5 F | HEART RATE: 92 BPM

## 2022-09-21 DIAGNOSIS — G30.1 LATE ONSET ALZHEIMER'S DISEASE WITH BEHAVIORAL DISTURBANCE (HCC): ICD-10-CM

## 2022-09-21 DIAGNOSIS — F01.50 VASCULAR DEMENTIA WITHOUT BEHAVIORAL DISTURBANCE (HCC): Primary | ICD-10-CM

## 2022-09-21 DIAGNOSIS — F02.818 LATE ONSET ALZHEIMER'S DISEASE WITH BEHAVIORAL DISTURBANCE (HCC): ICD-10-CM

## 2022-09-21 PROCEDURE — 1036F TOBACCO NON-USER: CPT | Performed by: INTERNAL MEDICINE

## 2022-09-21 PROCEDURE — G8427 DOCREV CUR MEDS BY ELIG CLIN: HCPCS | Performed by: INTERNAL MEDICINE

## 2022-09-21 PROCEDURE — G8417 CALC BMI ABV UP PARAM F/U: HCPCS | Performed by: INTERNAL MEDICINE

## 2022-09-21 PROCEDURE — 1123F ACP DISCUSS/DSCN MKR DOCD: CPT | Performed by: INTERNAL MEDICINE

## 2022-09-21 PROCEDURE — 1090F PRES/ABSN URINE INCON ASSESS: CPT | Performed by: INTERNAL MEDICINE

## 2022-09-21 PROCEDURE — 99212 OFFICE O/P EST SF 10 MIN: CPT | Performed by: INTERNAL MEDICINE

## 2022-09-21 PROCEDURE — G8399 PT W/DXA RESULTS DOCUMENT: HCPCS | Performed by: INTERNAL MEDICINE

## 2022-09-21 ASSESSMENT — LIFESTYLE VARIABLES
HOW OFTEN DO YOU HAVE A DRINK CONTAINING ALCOHOL: 2-4 TIMES A MONTH
HOW MANY STANDARD DRINKS CONTAINING ALCOHOL DO YOU HAVE ON A TYPICAL DAY: 1 OR 2

## 2022-09-21 ASSESSMENT — PATIENT HEALTH QUESTIONNAIRE - PHQ9
SUM OF ALL RESPONSES TO PHQ QUESTIONS 1-9: 0
2. FEELING DOWN, DEPRESSED OR HOPELESS: 0
SUM OF ALL RESPONSES TO PHQ QUESTIONS 1-9: 0
SUM OF ALL RESPONSES TO PHQ QUESTIONS 1-9: 0
1. LITTLE INTEREST OR PLEASURE IN DOING THINGS: 0
SUM OF ALL RESPONSES TO PHQ9 QUESTIONS 1 & 2: 0
SUM OF ALL RESPONSES TO PHQ QUESTIONS 1-9: 0

## 2022-09-21 NOTE — PROGRESS NOTES
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

## 2022-09-21 NOTE — PROGRESS NOTES
Chief Complaint   Patient presents with    6 Month Follow-Up     February follow up re: dementia. Here with son, Kleber.

## 2023-02-13 ENCOUNTER — HOSPITAL ENCOUNTER (OUTPATIENT)
Dept: INFUSION THERAPY | Age: 83
Setting detail: INFUSION SERIES
End: 2023-02-13

## 2023-02-13 ENCOUNTER — OFFICE VISIT (OUTPATIENT)
Dept: PRIMARY CARE CLINIC | Age: 83
End: 2023-02-13
Payer: MEDICARE

## 2023-02-13 VITALS
BODY MASS INDEX: 30.44 KG/M2 | HEIGHT: 58 IN | OXYGEN SATURATION: 97 % | SYSTOLIC BLOOD PRESSURE: 130 MMHG | WEIGHT: 145 LBS | RESPIRATION RATE: 18 BRPM | TEMPERATURE: 97.8 F | HEART RATE: 95 BPM | DIASTOLIC BLOOD PRESSURE: 86 MMHG

## 2023-02-13 DIAGNOSIS — E78.2 MIXED HYPERLIPIDEMIA: ICD-10-CM

## 2023-02-13 DIAGNOSIS — M81.0 AGE-RELATED OSTEOPOROSIS WITHOUT CURRENT PATHOLOGICAL FRACTURE: ICD-10-CM

## 2023-02-13 DIAGNOSIS — E55.9 VITAMIN D DEFICIENCY: ICD-10-CM

## 2023-02-13 DIAGNOSIS — I65.23 ASYMPTOMATIC BILATERAL CAROTID ARTERY STENOSIS: Chronic | ICD-10-CM

## 2023-02-13 DIAGNOSIS — R73.9 BLOOD GLUCOSE ELEVATED: ICD-10-CM

## 2023-02-13 DIAGNOSIS — G30.1 LATE ONSET ALZHEIMER'S DISEASE WITH BEHAVIORAL DISTURBANCE (HCC): ICD-10-CM

## 2023-02-13 DIAGNOSIS — F02.818 LATE ONSET ALZHEIMER'S DISEASE WITH BEHAVIORAL DISTURBANCE (HCC): ICD-10-CM

## 2023-02-13 DIAGNOSIS — I10 ESSENTIAL HYPERTENSION: Primary | ICD-10-CM

## 2023-02-13 PROBLEM — S22.000A CLOSED COMPRESSION FRACTURE OF THORACIC VERTEBRA (HCC): Status: RESOLVED | Noted: 2017-01-10 | Resolved: 2023-02-13

## 2023-02-13 PROCEDURE — G8417 CALC BMI ABV UP PARAM F/U: HCPCS | Performed by: INTERNAL MEDICINE

## 2023-02-13 PROCEDURE — 3074F SYST BP LT 130 MM HG: CPT | Performed by: INTERNAL MEDICINE

## 2023-02-13 PROCEDURE — 1036F TOBACCO NON-USER: CPT | Performed by: INTERNAL MEDICINE

## 2023-02-13 PROCEDURE — 1123F ACP DISCUSS/DSCN MKR DOCD: CPT | Performed by: INTERNAL MEDICINE

## 2023-02-13 PROCEDURE — 3078F DIAST BP <80 MM HG: CPT | Performed by: INTERNAL MEDICINE

## 2023-02-13 PROCEDURE — 1090F PRES/ABSN URINE INCON ASSESS: CPT | Performed by: INTERNAL MEDICINE

## 2023-02-13 PROCEDURE — G8399 PT W/DXA RESULTS DOCUMENT: HCPCS | Performed by: INTERNAL MEDICINE

## 2023-02-13 PROCEDURE — 99214 OFFICE O/P EST MOD 30 MIN: CPT | Performed by: INTERNAL MEDICINE

## 2023-02-13 PROCEDURE — G8484 FLU IMMUNIZE NO ADMIN: HCPCS | Performed by: INTERNAL MEDICINE

## 2023-02-13 PROCEDURE — G8427 DOCREV CUR MEDS BY ELIG CLIN: HCPCS | Performed by: INTERNAL MEDICINE

## 2023-02-13 SDOH — ECONOMIC STABILITY: HOUSING INSECURITY
IN THE LAST 12 MONTHS, WAS THERE A TIME WHEN YOU DID NOT HAVE A STEADY PLACE TO SLEEP OR SLEPT IN A SHELTER (INCLUDING NOW)?: NO

## 2023-02-13 SDOH — ECONOMIC STABILITY: FOOD INSECURITY: WITHIN THE PAST 12 MONTHS, YOU WORRIED THAT YOUR FOOD WOULD RUN OUT BEFORE YOU GOT MONEY TO BUY MORE.: NEVER TRUE

## 2023-02-13 SDOH — ECONOMIC STABILITY: FOOD INSECURITY: WITHIN THE PAST 12 MONTHS, THE FOOD YOU BOUGHT JUST DIDN'T LAST AND YOU DIDN'T HAVE MONEY TO GET MORE.: NEVER TRUE

## 2023-02-13 SDOH — ECONOMIC STABILITY: INCOME INSECURITY: HOW HARD IS IT FOR YOU TO PAY FOR THE VERY BASICS LIKE FOOD, HOUSING, MEDICAL CARE, AND HEATING?: NOT HARD AT ALL

## 2023-02-13 NOTE — PROGRESS NOTES
Brenda Alamo  2/13/23     Chief Complaint   Patient presents with    Hypertension     Check up         Allergies   Allergen Reactions    Alendronate Sodium         Current Outpatient Medications   Medication Sig Dispense Refill    Denosumab (PROLIA SC) Inject 60 mg into the skin every 6 months       No current facility-administered medications for this visit. HPI: Patient comes in for follow-up visit. She has not been in for a year and a half. She has been following up with Dr. Yary Villalta, geriatric specialist, for her Alzheimer's dementia which has been slowly progressive. She is accompanied today by her son who was present throughout the exam.  He is her primary caregiver and POA. She continues to live alone at home with close family support. She has been functioning fairly well with no safety issues. She does not cook. Her son brings her food. She has a history of osteoporosis and compression fracture a year and a half ago which was treated with kyphoplasty. She has been treated with Prolia 60 mg subcu every 6 months. She refuses to take any other medications. She denies any chest pain or shortness of breath. She denies any GI or  complaints. Review of Systems: as per HPI      Physical Exam:    Vitals:    02/13/23 1400   BP: 130/86   Pulse: 95   Resp:    Temp:    SpO2:        Patient is a 80 y.o. female. Patient appears to be in no distress. Breathing comfortably. Ambulates without assistance. HEENT: normal.  Neck supple, no adenopathy or bruits. Heart RR, no MGR. Lungs clear. Abd: normal  Ext: no edema. Peripheral pulses: normal.:  She demonstrates mild cognitive impairment but no focal neurologic deficits noted.     Lab Results   Component Value Date    WBC 5.6 02/13/2023    HGB 13.6 02/13/2023    HCT 40.9 02/13/2023     02/13/2023    CHOL 316 (H) 02/13/2023    TRIG 515 (H) 02/13/2023    HDL 40 02/13/2023    ALT 17 02/13/2023    AST 17 02/13/2023    TSH 2.120 02/13/2023 INR 1.1 01/09/2017    LABA1C 6.0 (H) 02/13/2023      Lab Results   Component Value Date     02/13/2023    K 4.9 02/13/2023     02/13/2023    CO2 26 02/13/2023    BUN 19 02/13/2023    CREATININE 0.6 02/13/2023    GLUCOSE 90 02/13/2023    CALCIUM 9.5 02/13/2023    PROT 7.1 02/13/2023    LABALBU 4.4 02/13/2023    BILITOT 0.3 02/13/2023    ALKPHOS 84 02/13/2023    AST 17 02/13/2023    ALT 17 02/13/2023    LABGLOM >60 02/13/2023    GFRAA >60 08/03/2022            Assessment:    Vickie Preston was seen today for hypertension. Diagnoses and all orders for this visit:    Essential hypertension  -     Comprehensive Metabolic Panel; Future  -     CBC; Future  -     TSH; Future    Age-related osteoporosis without current pathological fracture    Late onset Alzheimer's disease with behavioral disturbance ([facility] [address] slowly progressive with refusal to take any medication. No recent behavioral or safety issues. Asymptomatic bilateral carotid artery stenosis    Mixed hyperlipidemia, not well controlled and refuses any medication for hyperlipidemia. -     Lipid Panel; Future    Vitamin D insufficiency with vitamin D level at 26 ng/mL. -     Vitamin D 25 Hydroxy; Future    Blood glucose elevated, with borderline hemoglobin A1c is 6.0%. Currently on no medication for diabetes. -     Hemoglobin A1C; Future        Discussion Notes: Patient agreed to have labs done today. She needed a calcium level drawn prior to getting her Prolia injection which is scheduled for tomorrow. She will continue to follow-up with Dr. Cayden Smith for management of her dementia and return here on an as-needed basis.

## 2023-02-14 ENCOUNTER — HOSPITAL ENCOUNTER (OUTPATIENT)
Dept: INFUSION THERAPY | Age: 83
Setting detail: INFUSION SERIES
Discharge: HOME | End: 2023-02-14
Payer: MEDICARE

## 2023-02-14 VITALS
SYSTOLIC BLOOD PRESSURE: 165 MMHG | WEIGHT: 145 LBS | TEMPERATURE: 97.4 F | DIASTOLIC BLOOD PRESSURE: 79 MMHG | RESPIRATION RATE: 16 BRPM | BODY MASS INDEX: 30.31 KG/M2 | OXYGEN SATURATION: 98 %

## 2023-02-14 DIAGNOSIS — M80.00XD AGE-RELATED OSTEOPOROSIS WITH CURRENT PATHOLOGICAL FRACTURE WITH ROUTINE HEALING, SUBSEQUENT ENCOUNTER: Primary | ICD-10-CM

## 2023-02-14 PROCEDURE — 6360000002 HC RX W HCPCS: Performed by: INTERNAL MEDICINE

## 2023-02-14 PROCEDURE — 96372 THER/PROPH/DIAG INJ SC/IM: CPT

## 2023-02-14 RX ADMIN — DENOSUMAB 60 MG: 60 INJECTION SUBCUTANEOUS at 13:59

## 2023-02-14 ASSESSMENT — PAIN DESCRIPTION - FREQUENCY: FREQUENCY: INTERMITTENT

## 2023-02-14 ASSESSMENT — PAIN DESCRIPTION - ONSET: ONSET: ON-GOING

## 2023-02-14 NOTE — PROGRESS NOTES
Before   prolia  injection patient declined any infections, open wounds, or change in medical condition. Tolerated infusion well. Reviewed therapy plan, offered education material and/or discharge material, reviewed medication information and signs and symptoms  and educated on possible side effects, verbalizes good knowledge of current plan patient verbalizes understanding, and has no signs or symptoms to report at this time. Patient discharged. Patient alert and oriented x3. No distress noted. Vital signs stable. Patient denies any new or worsening pain. Patient denies any needs. All questions answered. Next appointment scheduled.

## 2023-04-05 NOTE — TELEPHONE ENCOUNTER
meds pended    Allergies state she is allergic to Fosamax caused leg and back pain and once off she felt better. Then you prescribed Actonel 35mg 1 weekly. Family doesn't know any meds she is taking because she throws them away. Med list still list Fosamax if not prescribing it cancel it in pending order and prescribe Actonel. 2030

## 2023-08-07 ENCOUNTER — TELEPHONE (OUTPATIENT)
Dept: PRIMARY CARE CLINIC | Age: 83
End: 2023-08-07

## 2023-08-07 DIAGNOSIS — I10 ESSENTIAL HYPERTENSION: Primary | ICD-10-CM

## 2023-08-07 NOTE — TELEPHONE ENCOUNTER
Pt calling his mom needs labs before her Prolia infusion she is getting next week.  Call maciej when labs are in epic

## 2023-08-11 ENCOUNTER — HOSPITAL ENCOUNTER (OUTPATIENT)
Age: 83
Discharge: HOME | End: 2023-08-11
Payer: MEDICARE

## 2023-08-11 DIAGNOSIS — I10 ESSENTIAL HYPERTENSION: ICD-10-CM

## 2023-08-11 LAB
ALBUMIN SERPL-MCNC: 4.6 G/DL (ref 3.5–5.2)
ALP SERPL-CCNC: 71 U/L (ref 35–104)
ALT SERPL-CCNC: 14 U/L (ref 0–32)
ANION GAP SERPL CALCULATED.3IONS-SCNC: 12 MMOL/L (ref 7–16)
AST SERPL-CCNC: 14 U/L (ref 0–31)
BILIRUB SERPL-MCNC: 0.3 MG/DL (ref 0–1.2)
BUN SERPL-MCNC: 25 MG/DL (ref 6–23)
CALCIUM SERPL-MCNC: 10 MG/DL (ref 8.6–10.2)
CHLORIDE SERPL-SCNC: 101 MMOL/L (ref 98–107)
CO2 SERPL-SCNC: 27 MMOL/L (ref 22–29)
CREAT SERPL-MCNC: 0.6 MG/DL (ref 0.5–1)
ERYTHROCYTE [DISTWIDTH] IN BLOOD BY AUTOMATED COUNT: 13.6 % (ref 11.5–15)
GFR SERPL CREATININE-BSD FRML MDRD: >60 ML/MIN/1.73M2
GLUCOSE SERPL-MCNC: 124 MG/DL (ref 74–99)
HCT VFR BLD AUTO: 43.6 % (ref 34–48)
HGB BLD-MCNC: 13.8 G/DL (ref 11.5–15.5)
MCH RBC QN AUTO: 31.4 PG (ref 26–35)
MCHC RBC AUTO-ENTMCNC: 31.7 G/DL (ref 32–34.5)
MCV RBC AUTO: 99.1 FL (ref 80–99.9)
PLATELET # BLD AUTO: 190 K/UL (ref 130–450)
PMV BLD AUTO: 8.8 FL (ref 7–12)
POTASSIUM SERPL-SCNC: 4.3 MMOL/L (ref 3.5–5)
PROT SERPL-MCNC: 7.5 G/DL (ref 6.4–8.3)
RBC # BLD AUTO: 4.4 M/UL (ref 3.5–5.5)
SODIUM SERPL-SCNC: 140 MMOL/L (ref 132–146)
WBC OTHER # BLD: 4.3 K/UL (ref 4.5–11.5)

## 2023-08-11 PROCEDURE — 80053 COMPREHEN METABOLIC PANEL: CPT

## 2023-08-11 PROCEDURE — 85027 COMPLETE CBC AUTOMATED: CPT

## 2023-08-14 ENCOUNTER — HOSPITAL ENCOUNTER (OUTPATIENT)
Dept: INFUSION THERAPY | Age: 83
Setting detail: INFUSION SERIES
Discharge: HOME | End: 2023-08-14
Payer: MEDICARE

## 2023-08-14 VITALS
DIASTOLIC BLOOD PRESSURE: 74 MMHG | RESPIRATION RATE: 16 BRPM | TEMPERATURE: 98.3 F | SYSTOLIC BLOOD PRESSURE: 121 MMHG | HEART RATE: 88 BPM

## 2023-08-14 DIAGNOSIS — M80.00XD AGE-RELATED OSTEOPOROSIS WITH CURRENT PATHOLOGICAL FRACTURE WITH ROUTINE HEALING, SUBSEQUENT ENCOUNTER: Primary | ICD-10-CM

## 2023-08-14 PROCEDURE — 6360000002 HC RX W HCPCS: Performed by: INTERNAL MEDICINE

## 2023-08-14 PROCEDURE — 96372 THER/PROPH/DIAG INJ SC/IM: CPT

## 2023-08-14 RX ADMIN — DENOSUMAB 60 MG: 60 INJECTION SUBCUTANEOUS at 14:21

## 2023-08-14 ASSESSMENT — PAIN DESCRIPTION - PAIN TYPE: TYPE: CHRONIC PAIN

## 2023-08-14 ASSESSMENT — PAIN DESCRIPTION - FREQUENCY: FREQUENCY: INTERMITTENT

## 2023-08-14 ASSESSMENT — PAIN DESCRIPTION - ONSET: ONSET: ON-GOING

## 2023-10-18 ENCOUNTER — OFFICE VISIT (OUTPATIENT)
Dept: GERIATRIC MEDICINE | Age: 83
End: 2023-10-18
Payer: MEDICARE

## 2023-10-18 VITALS
DIASTOLIC BLOOD PRESSURE: 76 MMHG | WEIGHT: 140.7 LBS | BODY MASS INDEX: 29.92 KG/M2 | RESPIRATION RATE: 16 BRPM | SYSTOLIC BLOOD PRESSURE: 120 MMHG | HEART RATE: 108 BPM | TEMPERATURE: 98.8 F

## 2023-10-18 DIAGNOSIS — G30.9 ALZHEIMER DISEASE (HCC): Primary | ICD-10-CM

## 2023-10-18 DIAGNOSIS — F02.80 ALZHEIMER DISEASE (HCC): Primary | ICD-10-CM

## 2023-10-18 PROCEDURE — G8417 CALC BMI ABV UP PARAM F/U: HCPCS | Performed by: INTERNAL MEDICINE

## 2023-10-18 PROCEDURE — G8399 PT W/DXA RESULTS DOCUMENT: HCPCS | Performed by: INTERNAL MEDICINE

## 2023-10-18 PROCEDURE — 1123F ACP DISCUSS/DSCN MKR DOCD: CPT | Performed by: INTERNAL MEDICINE

## 2023-10-18 PROCEDURE — G8427 DOCREV CUR MEDS BY ELIG CLIN: HCPCS | Performed by: INTERNAL MEDICINE

## 2023-10-18 PROCEDURE — 3074F SYST BP LT 130 MM HG: CPT | Performed by: INTERNAL MEDICINE

## 2023-10-18 PROCEDURE — 1036F TOBACCO NON-USER: CPT | Performed by: INTERNAL MEDICINE

## 2023-10-18 PROCEDURE — G8484 FLU IMMUNIZE NO ADMIN: HCPCS | Performed by: INTERNAL MEDICINE

## 2023-10-18 PROCEDURE — 3078F DIAST BP <80 MM HG: CPT | Performed by: INTERNAL MEDICINE

## 2023-10-18 PROCEDURE — 1090F PRES/ABSN URINE INCON ASSESS: CPT | Performed by: INTERNAL MEDICINE

## 2023-10-18 PROCEDURE — 99213 OFFICE O/P EST LOW 20 MIN: CPT | Performed by: INTERNAL MEDICINE

## 2023-10-18 NOTE — PROGRESS NOTES
CC HER BACK HURTS AND HX OF fX   No focal tenderness  IADL's covered by family  Rebecca Oliva   Here with son and migrated here after [address] may occur and 2 sons check up on her   [address]  same and may be less on clock  Bathing and foot care deficiency needs addressed soon  Impression; Vascular dementia same   Plan;Continue same

## 2024-01-08 ENCOUNTER — TELEPHONE (OUTPATIENT)
Dept: PRIMARY CARE CLINIC | Age: 84
End: 2024-01-08

## 2024-01-08 DIAGNOSIS — E83.52 HYPERCALCEMIA: ICD-10-CM

## 2024-01-08 DIAGNOSIS — R73.9 BLOOD GLUCOSE ELEVATED: ICD-10-CM

## 2024-01-08 DIAGNOSIS — E55.9 VITAMIN D DEFICIENCY: ICD-10-CM

## 2024-01-08 DIAGNOSIS — I10 ESSENTIAL HYPERTENSION: ICD-10-CM

## 2024-01-08 DIAGNOSIS — R53.83 FATIGUE, UNSPECIFIED TYPE: ICD-10-CM

## 2024-01-08 DIAGNOSIS — E78.2 MIXED HYPERLIPIDEMIA: ICD-10-CM

## 2024-01-08 DIAGNOSIS — R41.82 ALTERED MENTAL STATUS, UNSPECIFIED ALTERED MENTAL STATUS TYPE: Primary | ICD-10-CM

## 2024-01-08 NOTE — TELEPHONE ENCOUNTER
Pts daughter Rosalva calling her mom seems to be having increased confusion, and is requesting UA and Culture to check for UTI. She also scheduled an appt for 1/25/24 and needs complete labs put in Epic- she is due for her osteoporosis injection sometime in February and needs labs for that as well

## 2024-01-22 ENCOUNTER — TELEPHONE (OUTPATIENT)
Dept: PRIMARY CARE CLINIC | Age: 84
End: 2024-01-22

## 2024-01-22 NOTE — TELEPHONE ENCOUNTER
Calcium level is included in the comprehensive metabolic panel.  She can get her labs done anytime at any [facility] lab.

## 2024-01-22 NOTE — TELEPHONE ENCOUNTER
----- Message from Angelo Dalton sent at 1/22/2024 11:51 AM EST -----  Subject: Referral Request    Reason for referral request? calcium check added to current labs   Provider patient wants to be referred to(if known):     Provider Phone Number(if known):    Additional Information for Provider? Patient needs calcium check added to   current labs please advise once added before 2/14/24.  ---------------------------------------------------------------------------  --------------  CALL BACK INFO    [phone]; OK to leave message on voicemail  ---------------------------------------------------------------------------  --------------

## 2024-01-23 ENCOUNTER — HOSPITAL ENCOUNTER (OUTPATIENT)
Age: 84
Discharge: HOME | End: 2024-01-23
Payer: MEDICARE

## 2024-01-23 DIAGNOSIS — E55.9 VITAMIN D DEFICIENCY: ICD-10-CM

## 2024-01-23 DIAGNOSIS — E78.2 MIXED HYPERLIPIDEMIA: ICD-10-CM

## 2024-01-23 DIAGNOSIS — I10 ESSENTIAL HYPERTENSION: ICD-10-CM

## 2024-01-23 DIAGNOSIS — R53.83 FATIGUE, UNSPECIFIED TYPE: ICD-10-CM

## 2024-01-23 DIAGNOSIS — R41.82 ALTERED MENTAL STATUS, UNSPECIFIED ALTERED MENTAL STATUS TYPE: ICD-10-CM

## 2024-01-23 LAB
25(OH)D3 SERPL-MCNC: 22.4 NG/ML (ref 30–100)
ALBUMIN SERPL-MCNC: 4.5 G/DL (ref 3.5–5.2)
ALP SERPL-CCNC: 59 U/L (ref 35–104)
ALT SERPL-CCNC: 14 U/L (ref 0–32)
ANION GAP SERPL CALCULATED.3IONS-SCNC: 7 MMOL/L (ref 7–16)
AST SERPL-CCNC: 16 U/L (ref 0–31)
BASOPHILS # BLD: 0.02 K/UL (ref 0–0.2)
BASOPHILS NFR BLD: 1 % (ref 0–2)
BILIRUB SERPL-MCNC: 0.3 MG/DL (ref 0–1.2)
BILIRUB UR QL STRIP: NEGATIVE
BUN SERPL-MCNC: 24 MG/DL (ref 6–23)
CALCIUM SERPL-MCNC: 9.4 MG/DL (ref 8.6–10.2)
CHLORIDE SERPL-SCNC: 103 MMOL/L (ref 98–107)
CHOLEST SERPL-MCNC: 316 MG/DL
CLARITY UR: CLEAR
CO2 SERPL-SCNC: 29 MMOL/L (ref 22–29)
COLOR UR: YELLOW
COMMENT: NORMAL
CREAT SERPL-MCNC: 1.6 MG/DL (ref 0.5–1)
EOSINOPHIL # BLD: 0.07 K/UL (ref 0.05–0.5)
EOSINOPHILS RELATIVE PERCENT: 2 % (ref 0–6)
ERYTHROCYTE [DISTWIDTH] IN BLOOD BY AUTOMATED COUNT: 13.6 % (ref 11.5–15)
GFR SERPL CREATININE-BSD FRML MDRD: 33 ML/MIN/1.73M2
GLUCOSE SERPL-MCNC: 108 MG/DL (ref 74–99)
GLUCOSE UR STRIP-MCNC: NEGATIVE MG/DL
HCT VFR BLD AUTO: 43.1 % (ref 34–48)
HDLC SERPL-MCNC: 42 MG/DL
HGB BLD-MCNC: 13.3 G/DL (ref 11.5–15.5)
HGB UR QL STRIP.AUTO: NEGATIVE
IMM GRANULOCYTES # BLD AUTO: <0.03 K/UL (ref 0–0.58)
IMM GRANULOCYTES NFR BLD: 1 % (ref 0–5)
KETONES UR STRIP-MCNC: NEGATIVE MG/DL
LDLC SERPL CALC-MCNC: ABNORMAL MG/DL
LEUKOCYTE ESTERASE UR QL STRIP: NEGATIVE
LYMPHOCYTES NFR BLD: 1.39 K/UL (ref 1.5–4)
LYMPHOCYTES RELATIVE PERCENT: 34 % (ref 20–42)
MCH RBC QN AUTO: 31.2 PG (ref 26–35)
MCHC RBC AUTO-ENTMCNC: 30.9 G/DL (ref 32–34.5)
MCV RBC AUTO: 101.2 FL (ref 80–99.9)
MONOCYTES NFR BLD: 0.43 K/UL (ref 0.1–0.95)
MONOCYTES NFR BLD: 10 % (ref 2–12)
NEUTROPHILS NFR BLD: 53 % (ref 43–80)
NEUTS SEG NFR BLD: 2.21 K/UL (ref 1.8–7.3)
NITRITE UR QL STRIP: NEGATIVE
PH UR STRIP: 6.5 [PH] (ref 5–9)
PLATELET # BLD AUTO: 174 K/UL (ref 130–450)
PMV BLD AUTO: 9.3 FL (ref 7–12)
POTASSIUM SERPL-SCNC: 4 MMOL/L (ref 3.5–5)
PROT SERPL-MCNC: 7.4 G/DL (ref 6.4–8.3)
PROT UR STRIP-MCNC: NEGATIVE MG/DL
RBC # BLD AUTO: 4.26 M/UL (ref 3.5–5.5)
SODIUM SERPL-SCNC: 139 MMOL/L (ref 132–146)
SP GR UR STRIP: 1.02 (ref 1–1.03)
TRIGL SERPL-MCNC: 417 MG/DL
TSH SERPL DL<=0.05 MIU/L-ACNC: 1.82 UIU/ML (ref 0.27–4.2)
UROBILINOGEN UR STRIP-ACNC: 0.2 EU/DL (ref 0–1)
VIT B12 SERPL-MCNC: 544 PG/ML (ref 211–946)
VLDLC SERPL CALC-MCNC: ABNORMAL MG/DL
WBC OTHER # BLD: 4.1 K/UL (ref 4.5–11.5)

## 2024-01-23 PROCEDURE — 36415 COLL VENOUS BLD VENIPUNCTURE: CPT

## 2024-01-23 PROCEDURE — 85025 COMPLETE CBC W/AUTO DIFF WBC: CPT

## 2024-01-23 PROCEDURE — 82607 VITAMIN B-12: CPT

## 2024-01-23 PROCEDURE — 80061 LIPID PANEL: CPT

## 2024-01-23 PROCEDURE — 84443 ASSAY THYROID STIM HORMONE: CPT

## 2024-01-23 PROCEDURE — 87086 URINE CULTURE/COLONY COUNT: CPT

## 2024-01-23 PROCEDURE — 81003 URINALYSIS AUTO W/O SCOPE: CPT

## 2024-01-23 PROCEDURE — 82306 VITAMIN D 25 HYDROXY: CPT

## 2024-01-23 PROCEDURE — 80053 COMPREHEN METABOLIC PANEL: CPT

## 2024-01-25 ENCOUNTER — OFFICE VISIT (OUTPATIENT)
Dept: PRIMARY CARE CLINIC | Age: 84
End: 2024-01-25
Payer: MEDICARE

## 2024-01-25 VITALS
BODY MASS INDEX: 29.6 KG/M2 | SYSTOLIC BLOOD PRESSURE: 132 MMHG | RESPIRATION RATE: 16 BRPM | DIASTOLIC BLOOD PRESSURE: 70 MMHG | HEIGHT: 58 IN | HEART RATE: 92 BPM | WEIGHT: 141 LBS | OXYGEN SATURATION: 98 % | TEMPERATURE: 97.2 F

## 2024-01-25 DIAGNOSIS — E78.2 MIXED HYPERLIPIDEMIA: ICD-10-CM

## 2024-01-25 DIAGNOSIS — E55.9 VITAMIN D DEFICIENCY: ICD-10-CM

## 2024-01-25 DIAGNOSIS — I10 ESSENTIAL HYPERTENSION: ICD-10-CM

## 2024-01-25 DIAGNOSIS — F02.818 LATE ONSET ALZHEIMER'S DISEASE WITH BEHAVIORAL DISTURBANCE (HCC): ICD-10-CM

## 2024-01-25 DIAGNOSIS — E11.9 TYPE 2 DIABETES MELLITUS WITHOUT COMPLICATION, WITHOUT LONG-TERM CURRENT USE OF INSULIN (HCC): ICD-10-CM

## 2024-01-25 DIAGNOSIS — G30.1 LATE ONSET ALZHEIMER'S DISEASE WITH BEHAVIORAL DISTURBANCE (HCC): ICD-10-CM

## 2024-01-25 DIAGNOSIS — Z00.00 INITIAL MEDICARE ANNUAL WELLNESS VISIT: Primary | ICD-10-CM

## 2024-01-25 DIAGNOSIS — G31.84 MCI (MILD COGNITIVE IMPAIRMENT): ICD-10-CM

## 2024-01-25 LAB
MICROORGANISM SPEC CULT: ABNORMAL
SPECIMEN DESCRIPTION: ABNORMAL

## 2024-01-25 PROCEDURE — G8484 FLU IMMUNIZE NO ADMIN: HCPCS | Performed by: INTERNAL MEDICINE

## 2024-01-25 PROCEDURE — 3075F SYST BP GE 130 - 139MM HG: CPT | Performed by: INTERNAL MEDICINE

## 2024-01-25 PROCEDURE — G0438 PPPS, INITIAL VISIT: HCPCS | Performed by: INTERNAL MEDICINE

## 2024-01-25 PROCEDURE — 1123F ACP DISCUSS/DSCN MKR DOCD: CPT | Performed by: INTERNAL MEDICINE

## 2024-01-25 PROCEDURE — 3078F DIAST BP <80 MM HG: CPT | Performed by: INTERNAL MEDICINE

## 2024-01-25 NOTE — PROGRESS NOTES
Medicare Annual Wellness Visit    Brenda Alamo is here for Medicare AWV (W labs )    Assessment & Plan   Initial Medicare annual wellness visit  Late onset Alzheimer's disease with behavioral disturbance (HCC)  MCI (mild cognitive impairment)  Essential hypertension  -     Comprehensive Metabolic Panel; Future  -     CBC with Auto Differential; Future  -     TSH; Future  Mixed hyperlipidemia  -     Lipid Panel; Future  Type 2 diabetes mellitus without complication, without long-term current use of insulin (HCC)  Vitamin D deficiency  -     Vitamin D 25 Hydroxy; Future    Recommendations for Preventive Services Due: see orders and patient instructions/AVS.  Recommended screening schedule for the next 5-10 years is provided to the patient in written form: see Patient Instructions/AVS.     Return for Medicare Annual Wellness Visit in 1 year.     Subjective   Patient comes in for annual wellness visit.  She is accompanied by her son, who is her primary caregiver.  She has dementia and is followed by Dr. Tamayo, geriatric specialist.  She takes a multivitamin but other than that her son states that she refuses to take any prescription medications.  She denies any chest pain or shortness of breath.  She still lives alone with close family support.  She has not had any falls.    Patient's complete Health Risk Assessment and screening values have been reviewed and are found in Flowsheets. The following problems were reviewed today and where indicated follow up appointments were made and/or referrals ordered.    Positive Risk Factor Screenings with Interventions:       Cognitive:   Clock Drawing Test (CDT): (!) Abnormal  Words recalled: 0 Words Recalled  Total Score: (!) 0  Total Score Interpretation: Abnormal Mini-Cog  Interventions:  Patient follows up with her geriatric specialist.               Vision Screen:  Do you have difficulty driving, watching TV, or doing any of your daily activities because of your eyesight?:

## 2024-01-25 NOTE — PATIENT INSTRUCTIONS
diet alone, but a vitamin D supplement is usually necessary to meet this goal.  When exposed to the sun, use a sunscreen that protects against both UVA and UVB radiation with an SPF of 30 or greater. Reapply every 2 to 3 hours or after sweating, drying off with a towel, or swimming.  Always wear a seat belt when traveling in a car. Always wear a helmet when riding a bicycle or motorcycle.

## 2024-02-13 PROBLEM — E11.9 TYPE 2 DIABETES MELLITUS WITHOUT COMPLICATION, WITHOUT LONG-TERM CURRENT USE OF INSULIN (HCC): Status: ACTIVE | Noted: 2024-02-13

## 2024-02-14 ENCOUNTER — HOSPITAL ENCOUNTER (OUTPATIENT)
Dept: INFUSION THERAPY | Age: 84
Setting detail: INFUSION SERIES
Discharge: HOME | End: 2024-02-14
Payer: MEDICARE

## 2024-02-14 VITALS
TEMPERATURE: 96.8 F | HEIGHT: 58 IN | SYSTOLIC BLOOD PRESSURE: 133 MMHG | RESPIRATION RATE: 16 BRPM | DIASTOLIC BLOOD PRESSURE: 62 MMHG | BODY MASS INDEX: 29.6 KG/M2 | OXYGEN SATURATION: 99 % | WEIGHT: 141 LBS | HEART RATE: 85 BPM

## 2024-02-14 DIAGNOSIS — M80.00XD AGE-RELATED OSTEOPOROSIS WITH CURRENT PATHOLOGICAL FRACTURE WITH ROUTINE HEALING, SUBSEQUENT ENCOUNTER: Primary | ICD-10-CM

## 2024-02-14 PROCEDURE — 96372 THER/PROPH/DIAG INJ SC/IM: CPT

## 2024-02-14 PROCEDURE — 6360000002 HC RX W HCPCS: Performed by: INTERNAL MEDICINE

## 2024-02-14 RX ADMIN — DENOSUMAB 60 MG: 60 INJECTION SUBCUTANEOUS at 13:43

## 2024-02-14 NOTE — PROGRESS NOTES
Tolerated prolia injection well.  Reviewed therapy plan, offered education material and/or discharge material, reviewed medication information and signs and symptoms  and educated on possible side effects, verbalizes good knowledge of current plan patient verbalizes understanding, and has no signs or symptoms to report at this time.   Patient discharged. Patient alert and oriented x3.   No distress noted.   Vital signs stable.   Patient denies any new or worsening pain.  Patient denies any needs.  All questions answered.  Next appointment scheduled. Declines copy of AVS. Instructed on lab draw for next injection.

## 2024-07-23 ENCOUNTER — HOSPITAL ENCOUNTER (OUTPATIENT)
Age: 84
Discharge: HOME | End: 2024-07-23
Payer: MEDICARE

## 2024-07-23 DIAGNOSIS — E55.9 VITAMIN D DEFICIENCY: ICD-10-CM

## 2024-07-23 DIAGNOSIS — E78.2 MIXED HYPERLIPIDEMIA: ICD-10-CM

## 2024-07-23 DIAGNOSIS — I10 ESSENTIAL HYPERTENSION: ICD-10-CM

## 2024-07-23 DIAGNOSIS — E11.9 TYPE 2 DIABETES MELLITUS WITHOUT COMPLICATION, WITHOUT LONG-TERM CURRENT USE OF INSULIN (HCC): ICD-10-CM

## 2024-07-23 LAB
ALBUMIN SERPL-MCNC: 4.2 G/DL (ref 3.5–5.2)
ALP SERPL-CCNC: 67 U/L (ref 35–104)
ALT SERPL-CCNC: 19 U/L (ref 0–32)
ANION GAP SERPL CALCULATED.3IONS-SCNC: 8 MMOL/L (ref 7–16)
AST SERPL-CCNC: 18 U/L (ref 0–31)
BASOPHILS # BLD: 0.03 K/UL (ref 0–0.2)
BASOPHILS NFR BLD: 1 % (ref 0–2)
BILIRUB SERPL-MCNC: 0.4 MG/DL (ref 0–1.2)
BUN SERPL-MCNC: 29 MG/DL (ref 6–23)
CALCIUM SERPL-MCNC: 9 MG/DL (ref 8.6–10.2)
CHLORIDE SERPL-SCNC: 104 MMOL/L (ref 98–107)
CHOLEST SERPL-MCNC: 296 MG/DL
CO2 SERPL-SCNC: 27 MMOL/L (ref 22–29)
CREAT SERPL-MCNC: 0.6 MG/DL (ref 0.5–1)
EOSINOPHIL # BLD: 0.06 K/UL (ref 0.05–0.5)
EOSINOPHILS RELATIVE PERCENT: 1 % (ref 0–6)
ERYTHROCYTE [DISTWIDTH] IN BLOOD BY AUTOMATED COUNT: 14.1 % (ref 11.5–15)
GFR, ESTIMATED: 87 ML/MIN/1.73M2
GLUCOSE SERPL-MCNC: 108 MG/DL (ref 74–99)
HBA1C MFR BLD: 6 % (ref 4–5.6)
HCT VFR BLD AUTO: 40.1 % (ref 34–48)
HDLC SERPL-MCNC: 42 MG/DL
HGB BLD-MCNC: 12.6 G/DL (ref 11.5–15.5)
IMM GRANULOCYTES # BLD AUTO: <0.03 K/UL (ref 0–0.58)
IMM GRANULOCYTES NFR BLD: 0 % (ref 0–5)
LDLC SERPL CALC-MCNC: 205 MG/DL
LYMPHOCYTES NFR BLD: 1.47 K/UL (ref 1.5–4)
LYMPHOCYTES RELATIVE PERCENT: 28 % (ref 20–42)
MCH RBC QN AUTO: 31.9 PG (ref 26–35)
MCHC RBC AUTO-ENTMCNC: 31.4 G/DL (ref 32–34.5)
MCV RBC AUTO: 101.5 FL (ref 80–99.9)
MONOCYTES NFR BLD: 0.61 K/UL (ref 0.1–0.95)
MONOCYTES NFR BLD: 12 % (ref 2–12)
NEUTROPHILS NFR BLD: 58 % (ref 43–80)
NEUTS SEG NFR BLD: 2.99 K/UL (ref 1.8–7.3)
PLATELET # BLD AUTO: 175 K/UL (ref 130–450)
PMV BLD AUTO: 8.9 FL (ref 7–12)
POTASSIUM SERPL-SCNC: 4.3 MMOL/L (ref 3.5–5)
PROT SERPL-MCNC: 7.3 G/DL (ref 6.4–8.3)
RBC # BLD AUTO: 3.95 M/UL (ref 3.5–5.5)
SODIUM SERPL-SCNC: 139 MMOL/L (ref 132–146)
TRIGL SERPL-MCNC: 245 MG/DL
TSH SERPL DL<=0.05 MIU/L-ACNC: 1.16 UIU/ML (ref 0.27–4.2)
VLDLC SERPL CALC-MCNC: 49 MG/DL
WBC OTHER # BLD: 5.2 K/UL (ref 4.5–11.5)

## 2024-07-23 PROCEDURE — 82306 VITAMIN D 25 HYDROXY: CPT

## 2024-07-23 PROCEDURE — 80053 COMPREHEN METABOLIC PANEL: CPT

## 2024-07-23 PROCEDURE — 84443 ASSAY THYROID STIM HORMONE: CPT

## 2024-07-23 PROCEDURE — 80061 LIPID PANEL: CPT

## 2024-07-23 PROCEDURE — 83036 HEMOGLOBIN GLYCOSYLATED A1C: CPT

## 2024-07-23 PROCEDURE — 85025 COMPLETE CBC W/AUTO DIFF WBC: CPT

## 2024-07-23 PROCEDURE — 36415 COLL VENOUS BLD VENIPUNCTURE: CPT

## 2024-07-24 ENCOUNTER — OFFICE VISIT (OUTPATIENT)
Dept: GERIATRIC MEDICINE | Age: 84
End: 2024-07-24
Payer: MEDICARE

## 2024-07-24 VITALS — DIASTOLIC BLOOD PRESSURE: 72 MMHG | WEIGHT: 143 LBS | SYSTOLIC BLOOD PRESSURE: 128 MMHG | BODY MASS INDEX: 30.39 KG/M2

## 2024-07-24 DIAGNOSIS — F01.A11 MILD VASCULAR DEMENTIA WITH AGITATION (HCC): Primary | ICD-10-CM

## 2024-07-24 LAB — 25(OH)D3 SERPL-MCNC: 18.8 NG/ML (ref 30–100)

## 2024-07-24 PROCEDURE — G8399 PT W/DXA RESULTS DOCUMENT: HCPCS | Performed by: INTERNAL MEDICINE

## 2024-07-24 PROCEDURE — 1090F PRES/ABSN URINE INCON ASSESS: CPT | Performed by: INTERNAL MEDICINE

## 2024-07-24 PROCEDURE — 99213 OFFICE O/P EST LOW 20 MIN: CPT | Performed by: INTERNAL MEDICINE

## 2024-07-24 PROCEDURE — 3078F DIAST BP <80 MM HG: CPT | Performed by: INTERNAL MEDICINE

## 2024-07-24 PROCEDURE — G8427 DOCREV CUR MEDS BY ELIG CLIN: HCPCS | Performed by: INTERNAL MEDICINE

## 2024-07-24 PROCEDURE — 3074F SYST BP LT 130 MM HG: CPT | Performed by: INTERNAL MEDICINE

## 2024-07-24 PROCEDURE — 1036F TOBACCO NON-USER: CPT | Performed by: INTERNAL MEDICINE

## 2024-07-24 PROCEDURE — 1123F ACP DISCUSS/DSCN MKR DOCD: CPT | Performed by: INTERNAL MEDICINE

## 2024-07-24 PROCEDURE — G8417 CALC BMI ABV UP PARAM F/U: HCPCS | Performed by: INTERNAL MEDICINE

## 2024-07-24 SDOH — ECONOMIC STABILITY: FOOD INSECURITY: WITHIN THE PAST 12 MONTHS, YOU WORRIED THAT YOUR FOOD WOULD RUN OUT BEFORE YOU GOT MONEY TO BUY MORE.: SOMETIMES TRUE

## 2024-07-24 SDOH — ECONOMIC STABILITY: FOOD INSECURITY: WITHIN THE PAST 12 MONTHS, YOU WORRIED THAT YOUR FOOD WOULD RUN OUT BEFORE YOU GOT MONEY TO BUY MORE.: NEVER TRUE

## 2024-07-24 SDOH — ECONOMIC STABILITY: INCOME INSECURITY: HOW HARD IS IT FOR YOU TO PAY FOR THE VERY BASICS LIKE FOOD, HOUSING, MEDICAL CARE, AND HEATING?: NOT VERY HARD

## 2024-07-24 SDOH — ECONOMIC STABILITY: FOOD INSECURITY: WITHIN THE PAST 12 MONTHS, THE FOOD YOU BOUGHT JUST DIDN'T LAST AND YOU DIDN'T HAVE MONEY TO GET MORE.: NEVER TRUE

## 2024-07-24 SDOH — ECONOMIC STABILITY: FOOD INSECURITY: WITHIN THE PAST 12 MONTHS, THE FOOD YOU BOUGHT JUST DIDN'T LAST AND YOU DIDN'T HAVE MONEY TO GET MORE.: SOMETIMES TRUE

## 2024-07-24 NOTE — PROGRESS NOTES
septum midline, no significant congestion, oral cavity without lesions, fair dentition.     NECK:  fair-good ROM, no palpable masses, no carotid bruits, no JVD.    LUNGS:  clear to ausc, no rales, rhonchi or wheezes.     HEART:  RRR, no murmurs or gallops.     ABD:  soft, non-tender to palp, no palp masses or HSM, normal BS.     BACK:  no scoliosis or kyphosis, non-tender to palp.    EXTREMITIES: No edema, no ulcerations, varicosities or erythema.  No gross deformities.     MUSCULOSKELETAL: Nontender to palpation over the left greater trochanter.  Fair-good ROM of all joints, non tender to palp and or with movement.    SKIN: No ulcerations or breakdown, rash, ecchymosis, or other lesions.    NEURO: No tremor, motor UEs 5/5 LEs  5/5, sensory normal, gait normal with mild ataxia.     PSYCH: Pleasant and cooperative.  Fluid speech, oriented to person only.     Lab Results   Component Value Date    LABA1C 6.0 (H) 07/23/2024    LABA1C 6.0 (H) 02/13/2023    LABA1C 5.7 09/04/2019     Lab Results   Component Value Date    WBC 5.2 07/23/2024    HGB 12.6 07/23/2024    HCT 40.1 07/23/2024    .5 (H) 07/23/2024     07/23/2024    LYMPHOPCT 28 07/23/2024    RBC 3.95 07/23/2024    MCH 31.9 07/23/2024    MCHC 31.4 (L) 07/23/2024    RDW 14.1 07/23/2024     Lab Results   Component Value Date     07/23/2024    K 4.3 07/23/2024     07/23/2024    CO2 27 07/23/2024    BUN 29 (H) 07/23/2024    CREATININE 0.6 07/23/2024    GLUCOSE 108 (H) 07/23/2024    CALCIUM 9.0 07/23/2024    BILITOT 0.4 07/23/2024    ALKPHOS 67 07/23/2024    AST 18 07/23/2024    ALT 19 07/23/2024    LABGLOM 87 07/23/2024    GFRAA >60 08/03/2022     Lab Results   Component Value Date    CHOL 296 (H) 07/23/2024    TRIG 245 (H) 07/23/2024    HDL 42 07/23/2024    VLDL 49 07/23/2024     Lab Results   Component Value Date    TSH 1.16 07/23/2024    T4FREE 1.20 01/10/2017     ASSESSMENT & PLAN:    Diagnoses attached to this encounter:  Type 2 diabetes

## 2024-07-24 NOTE — PROGRESS NOTES
CC Recheck Dementia    Here with son  And may be about the same  Originally from [place]  Dog Putin  Inn at [place] or [place] or [place]  H&L clear   Toenails q 10 weeks   Impression:Vascular Dementia, no change in 5 yrears   Plan; Continue without meds           Follow up as usual

## 2024-07-24 NOTE — PATIENT INSTRUCTIONS
[facility] Food Resources*  (Call [employer]/211 for more resources)         HELP NETWORK OF [facility]:  What they do: Provides 24-hr, 7 days a week access to information on community resources for food & clothing help for [facility] AND [facility]  Phone: 211 or [phone]  Text “HELP NETWORK” to 748900 for local food resources  DEPARTMENT OF JOB AND FAMILY SERVICES:  What they do: SNAP, provide a card to use like cash to purchase healthy foods at approved retailers  [facility]  Phone: [phone]  [facility]  Phone: [phone]  [facility]  Phone: [phone]  Website: s.[place].[facility]:  [address] [place] [address]  What they offer: Food and clothing distribution on Tuesdays from 9 am to 12pm & Thursdays from 4pm to 7pm.   Phone Number: [phone] ext. [phone]  Website: www.[employer]  [facility] [employer]  What they offer: food items that stop at various housing and community services organizations, follow a month schedule.  Call to learn more.  Phone Number: [phone]      [facility] [employer]:  [address] [place] [address]  What they offer: Serves breakfast and lunch daily, 7 AM-9AM and 11AM-1PM (closed Sundays)  Contact: [phone]  [facility] [employer]: [address] [place] [address]  What they offer: Hot evening meals on Mondays and Tuesdays; doors open at 4:00PM; dinner served 5PM-6PM  Phone Number: [phone]  Website: [employer]    [facility]:  What they offer: Emergency food assistance  Phone number: [phone]  Website: [employer]  [employer]:  What they offer: Frozen meals for private pay, government funded programs and some insurances   Phone Number: [phone]  Website: [employer]  MEALS ON WHEELS ([facility]):  What they offer: Hot dinner and cold lunch Monday-Friday ($14/day); hot dinner Monday-Friday

## 2024-07-25 ENCOUNTER — OFFICE VISIT (OUTPATIENT)
Dept: PRIMARY CARE CLINIC | Age: 84
End: 2024-07-25

## 2024-07-25 VITALS
HEART RATE: 105 BPM | BODY MASS INDEX: 30.02 KG/M2 | HEIGHT: 58 IN | SYSTOLIC BLOOD PRESSURE: 136 MMHG | TEMPERATURE: 97.7 F | OXYGEN SATURATION: 95 % | RESPIRATION RATE: 16 BRPM | WEIGHT: 143 LBS | DIASTOLIC BLOOD PRESSURE: 68 MMHG

## 2024-07-25 DIAGNOSIS — G30.1 LATE ONSET ALZHEIMER'S DISEASE WITH BEHAVIORAL DISTURBANCE (HCC): ICD-10-CM

## 2024-07-25 DIAGNOSIS — I10 ESSENTIAL HYPERTENSION: ICD-10-CM

## 2024-07-25 DIAGNOSIS — E11.9 TYPE 2 DIABETES MELLITUS WITHOUT COMPLICATION, WITHOUT LONG-TERM CURRENT USE OF INSULIN (HCC): Primary | ICD-10-CM

## 2024-07-25 DIAGNOSIS — E78.2 MIXED HYPERLIPIDEMIA: ICD-10-CM

## 2024-07-25 DIAGNOSIS — F02.818 LATE ONSET ALZHEIMER'S DISEASE WITH BEHAVIORAL DISTURBANCE (HCC): ICD-10-CM

## 2024-07-25 DIAGNOSIS — M25.552 LEFT HIP PAIN: ICD-10-CM

## 2024-07-25 PROBLEM — H25.10 NUCLEAR SENILE CATARACT: Status: ACTIVE | Noted: 2022-07-23

## 2024-08-14 ENCOUNTER — HOSPITAL ENCOUNTER (OUTPATIENT)
Dept: INFUSION THERAPY | Age: 84
Setting detail: INFUSION SERIES
Discharge: HOME | End: 2024-08-14
Payer: MEDICARE

## 2024-08-14 VITALS
BODY MASS INDEX: 30.02 KG/M2 | RESPIRATION RATE: 16 BRPM | TEMPERATURE: 98.2 F | SYSTOLIC BLOOD PRESSURE: 142 MMHG | DIASTOLIC BLOOD PRESSURE: 82 MMHG | HEART RATE: 104 BPM | WEIGHT: 143 LBS | HEIGHT: 58 IN | OXYGEN SATURATION: 95 %

## 2024-08-14 DIAGNOSIS — M80.00XD AGE-RELATED OSTEOPOROSIS WITH CURRENT PATHOLOGICAL FRACTURE WITH ROUTINE HEALING, SUBSEQUENT ENCOUNTER: Primary | ICD-10-CM

## 2024-08-14 PROCEDURE — 6360000002 HC RX W HCPCS: Performed by: INTERNAL MEDICINE

## 2024-08-14 PROCEDURE — 96372 THER/PROPH/DIAG INJ SC/IM: CPT

## 2024-08-14 RX ADMIN — DENOSUMAB 60 MG: 60 INJECTION SUBCUTANEOUS at 13:59

## 2024-08-14 ASSESSMENT — PAIN - FUNCTIONAL ASSESSMENT: PAIN_FUNCTIONAL_ASSESSMENT: PREVENTS OR INTERFERES SOME ACTIVE ACTIVITIES AND ADLS

## 2024-08-14 ASSESSMENT — PAIN SCALES - GENERAL: PAINLEVEL_OUTOF10: 2

## 2024-08-14 ASSESSMENT — PAIN DESCRIPTION - ORIENTATION: ORIENTATION: LEFT;RIGHT

## 2024-08-14 ASSESSMENT — PAIN DESCRIPTION - LOCATION: LOCATION: HIP;BACK

## 2024-08-14 ASSESSMENT — PAIN DESCRIPTION - ONSET: ONSET: ON-GOING

## 2024-08-14 ASSESSMENT — PAIN DESCRIPTION - PAIN TYPE: TYPE: CHRONIC PAIN

## 2024-08-14 ASSESSMENT — PAIN DESCRIPTION - FREQUENCY: FREQUENCY: INTERMITTENT

## 2025-01-15 RX ORDER — HYDROCORTISONE SODIUM SUCCINATE 100 MG/2ML
100 INJECTION INTRAMUSCULAR; INTRAVENOUS
OUTPATIENT
Start: 2025-02-10

## 2025-01-15 RX ORDER — EPINEPHRINE 1 MG/ML
0.3 INJECTION, SOLUTION, CONCENTRATE INTRAVENOUS PRN
OUTPATIENT
Start: 2025-02-10

## 2025-01-15 RX ORDER — ONDANSETRON 2 MG/ML
8 INJECTION INTRAMUSCULAR; INTRAVENOUS
OUTPATIENT
Start: 2025-02-10

## 2025-01-15 RX ORDER — DIPHENHYDRAMINE HYDROCHLORIDE 50 MG/ML
50 INJECTION INTRAMUSCULAR; INTRAVENOUS
OUTPATIENT
Start: 2025-02-10

## 2025-01-15 RX ORDER — SODIUM CHLORIDE 9 MG/ML
INJECTION, SOLUTION INTRAVENOUS CONTINUOUS
OUTPATIENT
Start: 2025-02-10

## 2025-01-15 RX ORDER — ALBUTEROL SULFATE 90 UG/1
4 INHALANT RESPIRATORY (INHALATION) PRN
OUTPATIENT
Start: 2025-02-10

## 2025-01-15 RX ORDER — ACETAMINOPHEN 325 MG/1
650 TABLET ORAL
OUTPATIENT
Start: 2025-02-10

## 2025-02-03 NOTE — PROGRESS NOTES
2/4/2025     Chief Complaint   Patient presents with    Hypertension      HPI:  Patient comes in for routine follow-up visit. She is accompanied by her dtr Jennifer, who is visiting from [place], [place].  Marcelo her son is her primary caregiver. She has dementia and is followed by Dr. Tamayo, geriatric specialist. She takes a multivitamin but other than that she refuses to take any prescription medications.  She has been complaining occasionally of left hip and upper leg pain.  He has not noticed any limping or difficulty getting around.  She says she is sleeping well.  She denies any chest pain or shortness of breath. She still lives alone with close family support. She has not had any recent falls.       Review of Systems: as per HPI.    Past Medical History:   Diagnosis Date    Alzheimer disease (HCC)     Anxiety as acute reaction to gross stress 1/11/2017    Asymptomatic bilateral carotid artery stenosis 1/12/2017    HTN, goal below 140/80 1/11/2017    STRESS ??    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    DILATION AND CURETTAGE OF UTERUS  3/20/2002    uterine fibroids x 2    FIXATION KYPHOPLASTY  01/12/2017    t7    SPINE SURGERY N/A 9/30/2021    T10 KYPHOPLASTY---C ARM--[employer] performed by Fernandez Anne MD at [facility] OR     PHYSICAL EXAM:      Vitals:    02/04/25 1457   BP: 120/80   Pulse: 100   Resp: 16   Temp: 97.7 °F (36.5 °C)   SpO2: 96%   Weight: 65.8 kg (145 lb)   Height: 1.461 m (4' 9.52\")     Body mass index is 30.81 kg/m².    GEN:  elderly WDWN female patient seated in chair in NAD.     HEAD:  atraumatic, normocephalic.     EYES:  EOMI, PERRL, conjunctivae appear normal.    ENT: Good hearing, EACs without wax, TM's normal, nasal septum

## 2025-02-04 ENCOUNTER — OFFICE VISIT (OUTPATIENT)
Dept: PRIMARY CARE CLINIC | Age: 85
End: 2025-02-04
Payer: MEDICARE

## 2025-02-04 ENCOUNTER — OFFICE VISIT (OUTPATIENT)
Dept: PRIMARY CARE CLINIC | Age: 85
End: 2025-02-04

## 2025-02-04 VITALS
BODY MASS INDEX: 30.44 KG/M2 | TEMPERATURE: 97.7 F | SYSTOLIC BLOOD PRESSURE: 120 MMHG | HEIGHT: 58 IN | OXYGEN SATURATION: 96 % | WEIGHT: 145 LBS | HEART RATE: 100 BPM | DIASTOLIC BLOOD PRESSURE: 80 MMHG | RESPIRATION RATE: 16 BRPM

## 2025-02-04 VITALS
HEIGHT: 58 IN | TEMPERATURE: 97.7 F | WEIGHT: 145 LBS | BODY MASS INDEX: 30.44 KG/M2 | SYSTOLIC BLOOD PRESSURE: 120 MMHG | RESPIRATION RATE: 16 BRPM | DIASTOLIC BLOOD PRESSURE: 80 MMHG | HEART RATE: 100 BPM | OXYGEN SATURATION: 96 %

## 2025-02-04 DIAGNOSIS — M81.0 AGE-RELATED OSTEOPOROSIS WITHOUT CURRENT PATHOLOGICAL FRACTURE: ICD-10-CM

## 2025-02-04 DIAGNOSIS — G30.1 LATE ONSET ALZHEIMER'S DISEASE WITH BEHAVIORAL DISTURBANCE (HCC): ICD-10-CM

## 2025-02-04 DIAGNOSIS — E11.9 TYPE 2 DIABETES MELLITUS WITHOUT COMPLICATION, WITHOUT LONG-TERM CURRENT USE OF INSULIN (HCC): ICD-10-CM

## 2025-02-04 DIAGNOSIS — F02.818 LATE ONSET ALZHEIMER'S DISEASE WITH BEHAVIORAL DISTURBANCE (HCC): ICD-10-CM

## 2025-02-04 DIAGNOSIS — Z00.00 MEDICARE ANNUAL WELLNESS VISIT, SUBSEQUENT: Primary | ICD-10-CM

## 2025-02-04 DIAGNOSIS — I10 ESSENTIAL HYPERTENSION: Primary | ICD-10-CM

## 2025-02-04 DIAGNOSIS — E78.2 MIXED HYPERLIPIDEMIA: ICD-10-CM

## 2025-02-04 PROCEDURE — 1159F MED LIST DOCD IN RCRD: CPT | Performed by: FAMILY MEDICINE

## 2025-02-04 PROCEDURE — 1123F ACP DISCUSS/DSCN MKR DOCD: CPT | Performed by: FAMILY MEDICINE

## 2025-02-04 PROCEDURE — 1160F RVW MEDS BY RX/DR IN RCRD: CPT | Performed by: FAMILY MEDICINE

## 2025-02-04 PROCEDURE — 3079F DIAST BP 80-89 MM HG: CPT | Performed by: FAMILY MEDICINE

## 2025-02-04 PROCEDURE — 3074F SYST BP LT 130 MM HG: CPT | Performed by: FAMILY MEDICINE

## 2025-02-04 PROCEDURE — G0439 PPPS, SUBSEQ VISIT: HCPCS | Performed by: FAMILY MEDICINE

## 2025-02-04 RX ORDER — DENOSUMAB 60 MG/ML
60 INJECTION SUBCUTANEOUS
Qty: 1 ML | Refills: 0
Start: 2025-02-04

## 2025-02-04 SDOH — ECONOMIC STABILITY: FOOD INSECURITY: WITHIN THE PAST 12 MONTHS, YOU WORRIED THAT YOUR FOOD WOULD RUN OUT BEFORE YOU GOT MONEY TO BUY MORE.: NEVER TRUE

## 2025-02-04 SDOH — ECONOMIC STABILITY: FOOD INSECURITY: WITHIN THE PAST 12 MONTHS, THE FOOD YOU BOUGHT JUST DIDN'T LAST AND YOU DIDN'T HAVE MONEY TO GET MORE.: NEVER TRUE

## 2025-02-04 ASSESSMENT — PATIENT HEALTH QUESTIONNAIRE - PHQ9
SUM OF ALL RESPONSES TO PHQ QUESTIONS 1-9: 0
SUM OF ALL RESPONSES TO PHQ9 QUESTIONS 1 & 2: 0
1. LITTLE INTEREST OR PLEASURE IN DOING THINGS: NOT AT ALL
SUM OF ALL RESPONSES TO PHQ QUESTIONS 1-9: 0
2. FEELING DOWN, DEPRESSED OR HOPELESS: NOT AT ALL
SUM OF ALL RESPONSES TO PHQ QUESTIONS 1-9: 0
SUM OF ALL RESPONSES TO PHQ QUESTIONS 1-9: 0

## 2025-02-04 ASSESSMENT — LIFESTYLE VARIABLES
HOW OFTEN DO YOU HAVE A DRINK CONTAINING ALCOHOL: NEVER
HOW MANY STANDARD DRINKS CONTAINING ALCOHOL DO YOU HAVE ON A TYPICAL DAY: PATIENT DOES NOT DRINK

## 2025-02-04 NOTE — PROGRESS NOTES
Medicare Annual Wellness Visit    Brenda Alamo is here for Medicare AWV    Assessment & Plan   Medicare annual wellness visit, subsequent     Return for Medicare Annual Wellness Visit in 1 year.     Subjective     Patient's complete Health Risk Assessment and screening values have been reviewed and are found in Flowsheets. The following problems were reviewed today and where indicated follow up appointments were made and/or referrals ordered.    Positive Risk Factor Screenings with Interventions:     Cognitive:   Clock Drawing Test (CDT): (!) Abnormal  Words recalled: 0 Words Recalled  Total Score: (!) 0  Total Score Interpretation: Abnormal Mini-Cog  Interventions:  Patient advised to follow-up in this office for further evaluation and treatment             Poor Eating Habits/Diet:  Do you eat balanced/healthy meals regularly?: (!) No  Interventions:  low carbohydrate diet    Abnormal BMI (obese):  Body mass index is 30.81 kg/m². (!) Abnormal  Interventions:  Elderly patient with dementia who lives alone.                  Objective   Vitals:    02/04/25 1450   BP: 120/80   Pulse: 100   Resp: 16   Temp: 97.7 °F (36.5 °C)   SpO2: 96%   Weight: 65.8 kg (145 lb)   Height: 1.461 m (4' 9.52\")      Body mass index is 30.81 kg/m².             Allergies   Allergen Reactions    Alendronate Sodium      Prior to Visit Medications    Not on File       CareTeam (Including outside providers/suppliers regularly involved in providing care):   Patient Care Team:  Alfredito Hidalgo MD as PCP - General (Family Medicine & Geriatrics)  Alfredito Hidalgo MD as PCP - Empaneled Provider     Recommendations for Preventive Services Due: see orders and patient instructions/AVS.  Recommended screening schedule for the next 5-10 years is provided to the patient in written form: see Patient Instructions/AVS.     Reviewed and updated this visit:  Allergies  Meds  Med Hx  Surg Hx  Soc Hx  Fam Hx

## 2025-02-10 ENCOUNTER — TELEPHONE (OUTPATIENT)
Dept: PRIMARY CARE CLINIC | Age: 85
End: 2025-02-10

## 2025-02-10 DIAGNOSIS — E11.9 TYPE 2 DIABETES MELLITUS WITHOUT COMPLICATION, WITHOUT LONG-TERM CURRENT USE OF INSULIN (HCC): Primary | ICD-10-CM

## 2025-02-10 DIAGNOSIS — E78.2 MIXED HYPERLIPIDEMIA: ICD-10-CM

## 2025-02-10 DIAGNOSIS — I10 ESSENTIAL HYPERTENSION: ICD-10-CM

## 2025-02-10 NOTE — PROGRESS NOTES
Was following up to see if lab results posted and noticed new lab orders in EPIC with note they were put in I called main line listed for patient and left message to make sure they get labs before the appointment on Friday we need the results before she can get her shot left infusion number for return call if any questions.

## 2025-02-10 NOTE — PROGRESS NOTES
Patient had labs done at physician office called to Dr. Rock's office labs look like collected but not received they will follow up with the lab

## 2025-02-12 ENCOUNTER — HOSPITAL ENCOUNTER (OUTPATIENT)
Age: 85
Discharge: HOME | End: 2025-02-12
Payer: MEDICARE

## 2025-02-12 DIAGNOSIS — E11.9 TYPE 2 DIABETES MELLITUS WITHOUT COMPLICATION, WITHOUT LONG-TERM CURRENT USE OF INSULIN (HCC): ICD-10-CM

## 2025-02-12 DIAGNOSIS — I10 ESSENTIAL HYPERTENSION: ICD-10-CM

## 2025-02-12 LAB
ALBUMIN SERPL-MCNC: 4.5 G/DL (ref 3.5–5.2)
ALP SERPL-CCNC: 71 U/L (ref 35–104)
ALT SERPL-CCNC: 19 U/L (ref 0–32)
ANION GAP SERPL CALCULATED.3IONS-SCNC: 13 MMOL/L (ref 7–16)
AST SERPL-CCNC: 20 U/L (ref 0–31)
BASOPHILS # BLD: 0.04 K/UL (ref 0–0.2)
BASOPHILS NFR BLD: 1 % (ref 0–2)
BILIRUB SERPL-MCNC: 0.4 MG/DL (ref 0–1.2)
BUN SERPL-MCNC: 17 MG/DL (ref 6–23)
CALCIUM SERPL-MCNC: 9.7 MG/DL (ref 8.6–10.2)
CHLORIDE SERPL-SCNC: 95 MMOL/L (ref 98–107)
CHOLEST SERPL-MCNC: 328 MG/DL
CO2 SERPL-SCNC: 26 MMOL/L (ref 22–29)
CREAT SERPL-MCNC: 0.7 MG/DL (ref 0.5–1)
EOSINOPHIL # BLD: 0.1 K/UL (ref 0.05–0.5)
EOSINOPHILS RELATIVE PERCENT: 2 % (ref 0–6)
ERYTHROCYTE [DISTWIDTH] IN BLOOD BY AUTOMATED COUNT: 14.5 % (ref 11.5–15)
GFR, ESTIMATED: 85 ML/MIN/1.73M2
GLUCOSE SERPL-MCNC: 161 MG/DL (ref 74–99)
HBA1C MFR BLD: 5.7 % (ref 4–5.6)
HCT VFR BLD AUTO: 44 % (ref 34–48)
HDLC SERPL-MCNC: 51 MG/DL
HGB BLD-MCNC: 13.6 G/DL (ref 11.5–15.5)
IMM GRANULOCYTES # BLD AUTO: <0.03 K/UL (ref 0–0.58)
IMM GRANULOCYTES NFR BLD: 0 % (ref 0–5)
LDLC SERPL CALC-MCNC: 197 MG/DL
LYMPHOCYTES NFR BLD: 1.59 K/UL (ref 1.5–4)
LYMPHOCYTES RELATIVE PERCENT: 27 % (ref 20–42)
MCH RBC QN AUTO: 31.2 PG (ref 26–35)
MCHC RBC AUTO-ENTMCNC: 30.9 G/DL (ref 32–34.5)
MCV RBC AUTO: 100.9 FL (ref 80–99.9)
MONOCYTES NFR BLD: 0.54 K/UL (ref 0.1–0.95)
MONOCYTES NFR BLD: 9 % (ref 2–12)
NEUTROPHILS NFR BLD: 62 % (ref 43–80)
NEUTS SEG NFR BLD: 3.67 K/UL (ref 1.8–7.3)
PLATELET # BLD AUTO: 215 K/UL (ref 130–450)
PMV BLD AUTO: 9.3 FL (ref 7–12)
POTASSIUM SERPL-SCNC: 4.2 MMOL/L (ref 3.5–5)
PROT SERPL-MCNC: 7.8 G/DL (ref 6.4–8.3)
RBC # BLD AUTO: 4.36 M/UL (ref 3.5–5.5)
SODIUM SERPL-SCNC: 134 MMOL/L (ref 132–146)
TRIGL SERPL-MCNC: 399 MG/DL
TSH SERPL DL<=0.05 MIU/L-ACNC: 2.22 UIU/ML (ref 0.27–4.2)
VLDLC SERPL CALC-MCNC: 80 MG/DL
WBC OTHER # BLD: 6 K/UL (ref 4.5–11.5)

## 2025-02-12 PROCEDURE — 85025 COMPLETE CBC W/AUTO DIFF WBC: CPT

## 2025-02-12 PROCEDURE — 36415 COLL VENOUS BLD VENIPUNCTURE: CPT

## 2025-02-12 PROCEDURE — 84443 ASSAY THYROID STIM HORMONE: CPT

## 2025-02-12 PROCEDURE — 83036 HEMOGLOBIN GLYCOSYLATED A1C: CPT

## 2025-02-12 PROCEDURE — 80061 LIPID PANEL: CPT

## 2025-02-12 PROCEDURE — 80053 COMPREHEN METABOLIC PANEL: CPT

## 2025-02-14 ENCOUNTER — HOSPITAL ENCOUNTER (OUTPATIENT)
Dept: INFUSION THERAPY | Age: 85
Setting detail: INFUSION SERIES
Discharge: HOME | End: 2025-02-14
Payer: MEDICARE

## 2025-02-14 VITALS
OXYGEN SATURATION: 100 % | HEART RATE: 102 BPM | DIASTOLIC BLOOD PRESSURE: 71 MMHG | TEMPERATURE: 97.8 F | RESPIRATION RATE: 16 BRPM | SYSTOLIC BLOOD PRESSURE: 117 MMHG

## 2025-02-14 DIAGNOSIS — M80.00XD AGE-RELATED OSTEOPOROSIS WITH CURRENT PATHOLOGICAL FRACTURE WITH ROUTINE HEALING, SUBSEQUENT ENCOUNTER: Primary | ICD-10-CM

## 2025-02-14 PROCEDURE — 6360000002 HC RX W HCPCS: Performed by: FAMILY MEDICINE

## 2025-02-14 PROCEDURE — 96372 THER/PROPH/DIAG INJ SC/IM: CPT

## 2025-02-14 RX ORDER — EPINEPHRINE 1 MG/ML
0.3 INJECTION, SOLUTION, CONCENTRATE INTRAVENOUS PRN
OUTPATIENT
Start: 2025-08-15

## 2025-02-14 RX ORDER — ONDANSETRON 2 MG/ML
8 INJECTION INTRAMUSCULAR; INTRAVENOUS
OUTPATIENT
Start: 2025-08-15

## 2025-02-14 RX ORDER — ALBUTEROL SULFATE 90 UG/1
4 INHALANT RESPIRATORY (INHALATION) PRN
OUTPATIENT
Start: 2025-08-15

## 2025-02-14 RX ORDER — HYDROCORTISONE SODIUM SUCCINATE 100 MG/2ML
100 INJECTION INTRAMUSCULAR; INTRAVENOUS
OUTPATIENT
Start: 2025-08-15

## 2025-02-14 RX ORDER — SODIUM CHLORIDE 9 MG/ML
INJECTION, SOLUTION INTRAVENOUS CONTINUOUS
OUTPATIENT
Start: 2025-08-15

## 2025-02-14 RX ORDER — DIPHENHYDRAMINE HYDROCHLORIDE 50 MG/ML
50 INJECTION INTRAMUSCULAR; INTRAVENOUS
OUTPATIENT
Start: 2025-08-15

## 2025-02-14 RX ORDER — ACETAMINOPHEN 325 MG/1
650 TABLET ORAL
OUTPATIENT
Start: 2025-08-15

## 2025-02-14 RX ADMIN — DENOSUMAB 60 MG: 60 INJECTION SUBCUTANEOUS at 14:27

## 2025-02-14 NOTE — PROGRESS NOTES
Discussed dental clearance for prolia with [facility]  Nurse Kimberlyn-stated they do not require this-no order for it either. Patient denies pain to teeth or jaw. Last saw her dentist October 2024  per son. Gave son a note to let her dentist know she is on prolia Q 6months. Vitals stable . Discharged

## 2025-02-24 ENCOUNTER — OFFICE VISIT (OUTPATIENT)
Dept: GERIATRIC MEDICINE | Age: 85
End: 2025-02-24
Payer: MEDICARE

## 2025-02-24 VITALS
DIASTOLIC BLOOD PRESSURE: 64 MMHG | WEIGHT: 147 LBS | HEART RATE: 68 BPM | BODY MASS INDEX: 31.24 KG/M2 | SYSTOLIC BLOOD PRESSURE: 118 MMHG | OXYGEN SATURATION: 98 % | TEMPERATURE: 98.3 F

## 2025-02-24 DIAGNOSIS — G30.9 ALZHEIMER DISEASE (HCC): Primary | ICD-10-CM

## 2025-02-24 DIAGNOSIS — F02.80 ALZHEIMER DISEASE (HCC): Primary | ICD-10-CM

## 2025-02-24 PROCEDURE — 3074F SYST BP LT 130 MM HG: CPT | Performed by: INTERNAL MEDICINE

## 2025-02-24 PROCEDURE — 1159F MED LIST DOCD IN RCRD: CPT | Performed by: INTERNAL MEDICINE

## 2025-02-24 PROCEDURE — 3078F DIAST BP <80 MM HG: CPT | Performed by: INTERNAL MEDICINE

## 2025-02-24 PROCEDURE — G8427 DOCREV CUR MEDS BY ELIG CLIN: HCPCS | Performed by: INTERNAL MEDICINE

## 2025-02-24 PROCEDURE — 1036F TOBACCO NON-USER: CPT | Performed by: INTERNAL MEDICINE

## 2025-02-24 PROCEDURE — G8417 CALC BMI ABV UP PARAM F/U: HCPCS | Performed by: INTERNAL MEDICINE

## 2025-02-24 PROCEDURE — G8399 PT W/DXA RESULTS DOCUMENT: HCPCS | Performed by: INTERNAL MEDICINE

## 2025-02-24 PROCEDURE — 1090F PRES/ABSN URINE INCON ASSESS: CPT | Performed by: INTERNAL MEDICINE

## 2025-02-24 PROCEDURE — 99213 OFFICE O/P EST LOW 20 MIN: CPT | Performed by: INTERNAL MEDICINE

## 2025-02-24 PROCEDURE — 1123F ACP DISCUSS/DSCN MKR DOCD: CPT | Performed by: INTERNAL MEDICINE

## 2025-02-24 NOTE — PROGRESS NOTES
CC Evaluate need for placement    Here with son and he has issues  with her  Dog is getting very heavy and overfed  Not doing domestic chores  Stays home  through the night  Good neighborhood  Inspection of BR upstairs , plumbing out of order  Toilet out of order  Feces in bathtub and tons of undiscarded toliet tissue  Not handling any IADL's and most ADL's  No falls  No Accses to car   Impression:Mixed Dementia advanced  Spoke with son earlier and has attended   [facility] support groups with Lana Green  Impression:Advanced dementia at home  Plan; Son will make move to secure unit for dementia soon

## 2025-03-03 ENCOUNTER — TELEPHONE (OUTPATIENT)
Dept: GERIATRIC MEDICINE | Age: 85
End: 2025-03-03

## 2025-03-03 NOTE — TELEPHONE ENCOUNTER
Patients son called. Stated the patient will be going to an assisted living facility. He asked if it would benefit for the patient to be on a medication for her diagnosis He also has some questions about the life expectancy of the patient since her dementia seems to be getting worse and would like to discuss this with Dr Tamayo.

## 2025-04-14 ENCOUNTER — TELEPHONE (OUTPATIENT)
Dept: GERIATRIC MEDICINE | Age: 85
End: 2025-04-14

## 2025-04-14 RX ORDER — HALOPERIDOL 2 MG/ML
1 SOLUTION ORAL 2 TIMES DAILY
Qty: 30 ML | Refills: 3 | Status: SHIPPED | OUTPATIENT
Start: 2025-04-14

## 2025-04-14 NOTE — TELEPHONE ENCOUNTER
Patients son Luis Alberto called and stated the family tried to take the patient to [facility] over the weekend. When they were moving her in she became very combative and agitated. [facility] now will not accept Alba. Luis Alberto would like discuss the patients behaviors with Dr Tamayo. Please advise

## 2025-04-14 NOTE — TELEPHONE ENCOUNTER
Called Marcelo and heard about her refusing facility. Needs medicine to calm her down first, will try Haldol concentrate later.

## 2025-04-23 ENCOUNTER — TELEPHONE (OUTPATIENT)
Dept: PRIMARY CARE CLINIC | Age: 85
End: 2025-04-23

## 2025-04-23 NOTE — TELEPHONE ENCOUNTER
Pts son calling stating pt is c/o swollen left breast that is very painful. Dr Hidalgo is out of the office pt will need to go to ER for evaluation.

## 2025-04-27 ENCOUNTER — HOSPITAL ENCOUNTER (INPATIENT)
Age: 85
LOS: 4 days | Discharge: NURSING, REHAB OR LONG-TERM CARE | DRG: 598 | End: 2025-05-01
Attending: EMERGENCY MEDICINE | Admitting: INTERNAL MEDICINE
Payer: MEDICARE

## 2025-04-27 ENCOUNTER — APPOINTMENT (OUTPATIENT)
Dept: CT IMAGING | Age: 85
DRG: 598 | End: 2025-04-27
Payer: MEDICARE

## 2025-04-27 DIAGNOSIS — G30.9 ALZHEIMER'S DEMENTIA, UNSPECIFIED DEMENTIA SEVERITY, UNSPECIFIED TIMING OF DEMENTIA ONSET, UNSPECIFIED WHETHER BEHAVIORAL, PSYCHOTIC, OR MOOD DISTURBANCE OR ANXIETY (HCC): ICD-10-CM

## 2025-04-27 DIAGNOSIS — D73.89 SPLENIC LESION: ICD-10-CM

## 2025-04-27 DIAGNOSIS — N63.20 MASS OF LEFT BREAST, UNSPECIFIED QUADRANT: ICD-10-CM

## 2025-04-27 DIAGNOSIS — F02.818 LATE ONSET ALZHEIMER'S DISEASE WITH BEHAVIORAL DISTURBANCE (HCC): ICD-10-CM

## 2025-04-27 DIAGNOSIS — N63.23 MASS OF LOWER OUTER QUADRANT OF LEFT BREAST: Primary | ICD-10-CM

## 2025-04-27 DIAGNOSIS — Z51.5 ENCOUNTER FOR PALLIATIVE CARE: ICD-10-CM

## 2025-04-27 DIAGNOSIS — G30.1 LATE ONSET ALZHEIMER'S DISEASE WITH BEHAVIORAL DISTURBANCE (HCC): ICD-10-CM

## 2025-04-27 DIAGNOSIS — F02.80 ALZHEIMER'S DEMENTIA, UNSPECIFIED DEMENTIA SEVERITY, UNSPECIFIED TIMING OF DEMENTIA ONSET, UNSPECIFIED WHETHER BEHAVIORAL, PSYCHOTIC, OR MOOD DISTURBANCE OR ANXIETY (HCC): ICD-10-CM

## 2025-04-27 PROBLEM — C79.9 METASTATIC DISEASE (HCC): Status: ACTIVE | Noted: 2025-04-27

## 2025-04-27 PROBLEM — E87.20 LACTIC ACID ACIDOSIS: Status: ACTIVE | Noted: 2025-04-27

## 2025-04-27 LAB
ALBUMIN SERPL-MCNC: 4.1 G/DL (ref 3.5–5.2)
ALP SERPL-CCNC: 81 U/L (ref 35–104)
ALT SERPL-CCNC: 13 U/L (ref 0–32)
ANION GAP SERPL CALCULATED.3IONS-SCNC: 14 MMOL/L (ref 7–16)
AST SERPL-CCNC: 18 U/L (ref 0–31)
BASOPHILS # BLD: 0.04 K/UL (ref 0–0.2)
BASOPHILS NFR BLD: 1 % (ref 0–2)
BILIRUB SERPL-MCNC: 0.2 MG/DL (ref 0–1.2)
BUN SERPL-MCNC: 17 MG/DL (ref 6–23)
CALCIUM SERPL-MCNC: 9.7 MG/DL (ref 8.6–10.2)
CHLORIDE SERPL-SCNC: 101 MMOL/L (ref 98–107)
CO2 SERPL-SCNC: 23 MMOL/L (ref 22–29)
CREAT SERPL-MCNC: 0.6 MG/DL (ref 0.5–1)
EOSINOPHIL # BLD: 0.13 K/UL (ref 0.05–0.5)
EOSINOPHILS RELATIVE PERCENT: 2 % (ref 0–6)
ERYTHROCYTE [DISTWIDTH] IN BLOOD BY AUTOMATED COUNT: 14.4 % (ref 11.5–15)
GFR, ESTIMATED: 88 ML/MIN/1.73M2
GLUCOSE SERPL-MCNC: 135 MG/DL (ref 74–99)
HCT VFR BLD AUTO: 37.4 % (ref 34–48)
HGB BLD-MCNC: 12 G/DL (ref 11.5–15.5)
IMM GRANULOCYTES # BLD AUTO: 0.03 K/UL (ref 0–0.58)
IMM GRANULOCYTES NFR BLD: 1 % (ref 0–5)
LACTATE BLDV-SCNC: 2.8 MMOL/L (ref 0.5–2.2)
LYMPHOCYTES NFR BLD: 1.62 K/UL (ref 1.5–4)
LYMPHOCYTES RELATIVE PERCENT: 30 % (ref 20–42)
MCH RBC QN AUTO: 30.8 PG (ref 26–35)
MCHC RBC AUTO-ENTMCNC: 32.1 G/DL (ref 32–34.5)
MCV RBC AUTO: 96.1 FL (ref 80–99.9)
MONOCYTES NFR BLD: 0.71 K/UL (ref 0.1–0.95)
MONOCYTES NFR BLD: 13 % (ref 2–12)
NEUTROPHILS NFR BLD: 53 % (ref 43–80)
NEUTS SEG NFR BLD: 2.84 K/UL (ref 1.8–7.3)
PLATELET # BLD AUTO: 250 K/UL (ref 130–450)
PMV BLD AUTO: 8.9 FL (ref 7–12)
POTASSIUM SERPL-SCNC: 4.2 MMOL/L (ref 3.5–5)
PROT SERPL-MCNC: 7.1 G/DL (ref 6.4–8.3)
RBC # BLD AUTO: 3.89 M/UL (ref 3.5–5.5)
SODIUM SERPL-SCNC: 138 MMOL/L (ref 132–146)
WBC OTHER # BLD: 5.4 K/UL (ref 4.5–11.5)

## 2025-04-27 PROCEDURE — 80053 COMPREHEN METABOLIC PANEL: CPT

## 2025-04-27 PROCEDURE — 6370000000 HC RX 637 (ALT 250 FOR IP): Performed by: EMERGENCY MEDICINE

## 2025-04-27 PROCEDURE — 2580000003 HC RX 258: Performed by: EMERGENCY MEDICINE

## 2025-04-27 PROCEDURE — 83605 ASSAY OF LACTIC ACID: CPT

## 2025-04-27 PROCEDURE — 6360000004 HC RX CONTRAST MEDICATION: Performed by: RADIOLOGY

## 2025-04-27 PROCEDURE — 99285 EMERGENCY DEPT VISIT HI MDM: CPT

## 2025-04-27 PROCEDURE — 85025 COMPLETE CBC W/AUTO DIFF WBC: CPT

## 2025-04-27 PROCEDURE — 71260 CT THORAX DX C+: CPT

## 2025-04-27 PROCEDURE — 87040 BLOOD CULTURE FOR BACTERIA: CPT

## 2025-04-27 PROCEDURE — 2060000000 HC ICU INTERMEDIATE R&B

## 2025-04-27 RX ORDER — IOPAMIDOL 755 MG/ML
75 INJECTION, SOLUTION INTRAVASCULAR
Status: COMPLETED | OUTPATIENT
Start: 2025-04-27 | End: 2025-04-27

## 2025-04-27 RX ORDER — HALOPERIDOL 2 MG/ML
1 SOLUTION ORAL ONCE
Status: COMPLETED | OUTPATIENT
Start: 2025-04-27 | End: 2025-04-27

## 2025-04-27 RX ORDER — ENOXAPARIN SODIUM 100 MG/ML
40 INJECTION SUBCUTANEOUS DAILY
Status: CANCELLED | OUTPATIENT
Start: 2025-04-28

## 2025-04-27 RX ORDER — 0.9 % SODIUM CHLORIDE 0.9 %
500 INTRAVENOUS SOLUTION INTRAVENOUS ONCE
Status: COMPLETED | OUTPATIENT
Start: 2025-04-27 | End: 2025-04-27

## 2025-04-27 RX ADMIN — SODIUM CHLORIDE 500 ML: 0.9 INJECTION, SOLUTION INTRAVENOUS at 22:25

## 2025-04-27 RX ADMIN — HALOPERIDOL 1 MG: 2 SOLUTION ORAL at 22:25

## 2025-04-27 RX ADMIN — IOPAMIDOL 75 ML: 755 INJECTION, SOLUTION INTRAVENOUS at 19:26

## 2025-04-27 ASSESSMENT — PAIN - FUNCTIONAL ASSESSMENT: PAIN_FUNCTIONAL_ASSESSMENT: NONE - DENIES PAIN

## 2025-04-27 NOTE — ED PROVIDER NOTES
[facility] 4S INTERMEDIATE 1  EMERGENCY DEPARTMENT ENCOUNTER        Pt Name: Brenda Alamo  MRN: [id number]  Birthdate 1940  Date of evaluation: 4/27/2025  Provider: Tanja Kwong MD  PCP: Alfredito Hidalgo MD  Note Started: 5:22 PM EDT 4/27/25    CHIEF COMPLAINT       Chief Complaint   Patient presents with    Wound Check     Left breast wound.        HISTORY OF PRESENT ILLNESS: 1 or more Elements   History From:  patient     Limitations to history : Patient has history of Alzheimer    Brenda Alamo is a 85 y.o. female who presents to the emergency department with her son at bedside.  She lives by herself.  She has history of mild dementia.  He was visiting with her yesterday noted some blood on her shirt overlying her left breast.  He brought her in for further evaluation.  She is unclear as to how long she has noticed \"the wound\" there is an infiltrative mass overlying the left nipple on the left lateral lower lateral breast area.  It is large in size.  There is no active drainage but there is dried crusting areas over it she denies any fevers or chills or weight loss.  She had breast cancer in the right breast in 2000 that was removed with lumpectomy.  No nausea vomiting or diarrhea.  No fevers or chills    Nursing Notes were all reviewed and agreed with or any disagreements were addressed in the HPI.      REVIEW OF EXTERNAL NOTE :       Lab Results   Component Value Date    LVEF 68 01/11/2017             REVIEW OF SYSTEMS :           Positives and Pertinent negatives as per HPI.     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    DILATION AND CURETTAGE OF UTERUS  3/20/2002    uterine fibroids x 2    FIXATION KYPHOPLASTY  01/12/2017    t7    SPINE SURGERY N/A 9/30/2021    T10 KYPHOPLASTY---C ARM--[employer] performed by Fernandez

## 2025-04-28 ENCOUNTER — TELEPHONE (OUTPATIENT)
Dept: GERIATRIC MEDICINE | Age: 85
End: 2025-04-28

## 2025-04-28 LAB
ANION GAP SERPL CALCULATED.3IONS-SCNC: 14 MMOL/L (ref 7–16)
BASOPHILS # BLD: 0.03 K/UL (ref 0–0.2)
BASOPHILS NFR BLD: 1 % (ref 0–2)
BUN SERPL-MCNC: 13 MG/DL (ref 6–23)
CALCIUM SERPL-MCNC: 8.7 MG/DL (ref 8.6–10.2)
CHLORIDE SERPL-SCNC: 103 MMOL/L (ref 98–107)
CO2 SERPL-SCNC: 20 MMOL/L (ref 22–29)
CREAT SERPL-MCNC: 0.5 MG/DL (ref 0.5–1)
EOSINOPHIL # BLD: 0.1 K/UL (ref 0.05–0.5)
EOSINOPHILS RELATIVE PERCENT: 2 % (ref 0–6)
ERYTHROCYTE [DISTWIDTH] IN BLOOD BY AUTOMATED COUNT: 14.5 % (ref 11.5–15)
GFR, ESTIMATED: >90 ML/MIN/1.73M2
GLUCOSE SERPL-MCNC: 135 MG/DL (ref 74–99)
HCT VFR BLD AUTO: 36.3 % (ref 34–48)
HGB BLD-MCNC: 11.5 G/DL (ref 11.5–15.5)
IMM GRANULOCYTES # BLD AUTO: 0.04 K/UL (ref 0–0.58)
IMM GRANULOCYTES NFR BLD: 1 % (ref 0–5)
LACTATE BLDV-SCNC: 1.9 MMOL/L (ref 0.5–2.2)
LYMPHOCYTES NFR BLD: 1.3 K/UL (ref 1.5–4)
LYMPHOCYTES RELATIVE PERCENT: 22 % (ref 20–42)
MCH RBC QN AUTO: 30.3 PG (ref 26–35)
MCHC RBC AUTO-ENTMCNC: 31.7 G/DL (ref 32–34.5)
MCV RBC AUTO: 95.5 FL (ref 80–99.9)
MONOCYTES NFR BLD: 0.66 K/UL (ref 0.1–0.95)
MONOCYTES NFR BLD: 11 % (ref 2–12)
NEUTROPHILS NFR BLD: 64 % (ref 43–80)
NEUTS SEG NFR BLD: 3.72 K/UL (ref 1.8–7.3)
PLATELET # BLD AUTO: 211 K/UL (ref 130–450)
PMV BLD AUTO: 9 FL (ref 7–12)
POTASSIUM SERPL-SCNC: 4 MMOL/L (ref 3.5–5)
RBC # BLD AUTO: 3.8 M/UL (ref 3.5–5.5)
SODIUM SERPL-SCNC: 137 MMOL/L (ref 132–146)
WBC OTHER # BLD: 5.9 K/UL (ref 4.5–11.5)

## 2025-04-28 PROCEDURE — 2580000003 HC RX 258: Performed by: NURSE PRACTITIONER

## 2025-04-28 PROCEDURE — 88341 IMHCHEM/IMCYTCHM EA ADD ANTB: CPT

## 2025-04-28 PROCEDURE — 85025 COMPLETE CBC W/AUTO DIFF WBC: CPT

## 2025-04-28 PROCEDURE — 83605 ASSAY OF LACTIC ACID: CPT

## 2025-04-28 PROCEDURE — 2500000003 HC RX 250 WO HCPCS: Performed by: NURSE PRACTITIONER

## 2025-04-28 PROCEDURE — 6360000002 HC RX W HCPCS

## 2025-04-28 PROCEDURE — 80048 BASIC METABOLIC PNL TOTAL CA: CPT

## 2025-04-28 PROCEDURE — 6370000000 HC RX 637 (ALT 250 FOR IP)

## 2025-04-28 PROCEDURE — 0HB5XZX EXCISION OF CHEST SKIN, EXTERNAL APPROACH, DIAGNOSTIC: ICD-10-PCS | Performed by: STUDENT IN AN ORGANIZED HEALTH CARE EDUCATION/TRAINING PROGRAM

## 2025-04-28 PROCEDURE — 6370000000 HC RX 637 (ALT 250 FOR IP): Performed by: INTERNAL MEDICINE

## 2025-04-28 PROCEDURE — 2060000000 HC ICU INTERMEDIATE R&B

## 2025-04-28 PROCEDURE — 88360 TUMOR IMMUNOHISTOCHEM/MANUAL: CPT

## 2025-04-28 PROCEDURE — 88305 TISSUE EXAM BY PATHOLOGIST: CPT

## 2025-04-28 PROCEDURE — 99222 1ST HOSP IP/OBS MODERATE 55: CPT | Performed by: STUDENT IN AN ORGANIZED HEALTH CARE EDUCATION/TRAINING PROGRAM

## 2025-04-28 PROCEDURE — 88342 IMHCHEM/IMCYTCHM 1ST ANTB: CPT

## 2025-04-28 PROCEDURE — 11104 PUNCH BX SKIN SINGLE LESION: CPT | Performed by: STUDENT IN AN ORGANIZED HEALTH CARE EDUCATION/TRAINING PROGRAM

## 2025-04-28 RX ORDER — SODIUM CHLORIDE 0.9 % (FLUSH) 0.9 %
5-40 SYRINGE (ML) INJECTION EVERY 12 HOURS SCHEDULED
Status: DISCONTINUED | OUTPATIENT
Start: 2025-04-28 | End: 2025-05-01 | Stop reason: HOSPADM

## 2025-04-28 RX ORDER — ACETAMINOPHEN 325 MG/1
650 TABLET ORAL EVERY 6 HOURS PRN
Status: DISCONTINUED | OUTPATIENT
Start: 2025-04-28 | End: 2025-05-01 | Stop reason: HOSPADM

## 2025-04-28 RX ORDER — POTASSIUM CHLORIDE 7.45 MG/ML
10 INJECTION INTRAVENOUS PRN
Status: ACTIVE | OUTPATIENT
Start: 2025-04-28 | End: 2025-05-01

## 2025-04-28 RX ORDER — SODIUM HYPOCHLORITE 1.25 MG/ML
SOLUTION TOPICAL DAILY
Status: DISCONTINUED | OUTPATIENT
Start: 2025-04-28 | End: 2025-05-01 | Stop reason: HOSPADM

## 2025-04-28 RX ORDER — SODIUM CHLORIDE 9 MG/ML
INJECTION, SOLUTION INTRAVENOUS PRN
Status: DISCONTINUED | OUTPATIENT
Start: 2025-04-28 | End: 2025-05-01 | Stop reason: HOSPADM

## 2025-04-28 RX ORDER — SODIUM HYPOCHLORITE 1.25 MG/ML
SOLUTION TOPICAL PRN
Status: DISCONTINUED | OUTPATIENT
Start: 2025-04-28 | End: 2025-05-01 | Stop reason: HOSPADM

## 2025-04-28 RX ORDER — LORAZEPAM 2 MG/ML
1 INJECTION INTRAMUSCULAR ONCE
Status: COMPLETED | OUTPATIENT
Start: 2025-04-28 | End: 2025-04-28

## 2025-04-28 RX ORDER — POTASSIUM CHLORIDE 1500 MG/1
40 TABLET, EXTENDED RELEASE ORAL PRN
Status: ACTIVE | OUTPATIENT
Start: 2025-04-28 | End: 2025-05-01

## 2025-04-28 RX ORDER — QUETIAPINE FUMARATE 25 MG/1
25 TABLET, FILM COATED ORAL EVERY 6 HOURS PRN
Status: DISCONTINUED | OUTPATIENT
Start: 2025-04-28 | End: 2025-05-01 | Stop reason: HOSPADM

## 2025-04-28 RX ORDER — PROCHLORPERAZINE EDISYLATE 5 MG/ML
5 INJECTION INTRAMUSCULAR; INTRAVENOUS EVERY 6 HOURS PRN
Status: DISCONTINUED | OUTPATIENT
Start: 2025-04-28 | End: 2025-05-01 | Stop reason: HOSPADM

## 2025-04-28 RX ORDER — SODIUM CHLORIDE 9 MG/ML
INJECTION, SOLUTION INTRAVENOUS CONTINUOUS
Status: DISCONTINUED | OUTPATIENT
Start: 2025-04-28 | End: 2025-04-28

## 2025-04-28 RX ORDER — ACETAMINOPHEN 650 MG/1
650 SUPPOSITORY RECTAL EVERY 6 HOURS PRN
Status: DISCONTINUED | OUTPATIENT
Start: 2025-04-28 | End: 2025-05-01 | Stop reason: HOSPADM

## 2025-04-28 RX ORDER — LORAZEPAM 0.5 MG/1
0.5 TABLET ORAL EVERY 6 HOURS PRN
Status: DISCONTINUED | OUTPATIENT
Start: 2025-04-28 | End: 2025-05-01 | Stop reason: HOSPADM

## 2025-04-28 RX ORDER — BISACODYL 5 MG/1
5 TABLET, DELAYED RELEASE ORAL DAILY PRN
Status: DISCONTINUED | OUTPATIENT
Start: 2025-04-28 | End: 2025-05-01 | Stop reason: HOSPADM

## 2025-04-28 RX ORDER — SODIUM CHLORIDE 0.9 % (FLUSH) 0.9 %
5-40 SYRINGE (ML) INJECTION PRN
Status: DISCONTINUED | OUTPATIENT
Start: 2025-04-28 | End: 2025-05-01 | Stop reason: HOSPADM

## 2025-04-28 RX ORDER — MAGNESIUM SULFATE IN WATER 40 MG/ML
2000 INJECTION, SOLUTION INTRAVENOUS PRN
Status: DISCONTINUED | OUTPATIENT
Start: 2025-04-28 | End: 2025-05-01 | Stop reason: HOSPADM

## 2025-04-28 RX ADMIN — SODIUM CHLORIDE, PRESERVATIVE FREE 10 ML: 5 INJECTION INTRAVENOUS at 20:23

## 2025-04-28 RX ADMIN — SODIUM HYPOCHLORITE: 1.25 SOLUTION TOPICAL at 14:01

## 2025-04-28 RX ADMIN — LORAZEPAM 1 MG: 2 INJECTION INTRAMUSCULAR; INTRAVENOUS at 20:23

## 2025-04-28 RX ADMIN — LORAZEPAM 0.5 MG: 0.5 TABLET ORAL at 13:27

## 2025-04-28 RX ADMIN — SODIUM CHLORIDE: 0.9 INJECTION, SOLUTION INTRAVENOUS at 00:14

## 2025-04-28 RX ADMIN — SODIUM CHLORIDE: 0.9 INJECTION, SOLUTION INTRAVENOUS at 13:55

## 2025-04-28 RX ADMIN — QUETIAPINE FUMARATE 25 MG: 25 TABLET ORAL at 16:06

## 2025-04-28 ASSESSMENT — ENCOUNTER SYMPTOMS: COLOR CHANGE: 1

## 2025-04-28 NOTE — PROGRESS NOTES
Pre-Operative Diagnosis            Status post chemo for rectal cancer                  Post-Operative Diagnosis            Same                  Performed by            Lyndon                  Assistant                   Anesthetic Used            Xylocaine 1% local                  Procedures Performed            Removal of PowerPort                  Description of Procedure            The field was prepped and draped in a sterile manner and the area of the port infiltrated with 1% Xylocaine. ?The old incision was reopened the port identified dissected free and excised. ?The catheter was intact. ?The catheter tract was secure with a 3-0 Vicryl suture.                  Findings                   Specimens Removed                   Estimated Blood Loss                   Blood Administered (Yes/No)                   Complications                   Grafts/Implants                    Kirk Adams NP, notified of consult via secure text  Dian collado

## 2025-04-28 NOTE — PROGRESS NOTES
New patient consult called to Dr. Snowden for large left breast mass with possible metastasis to spleen

## 2025-04-28 NOTE — ACP (ADVANCE CARE PLANNING)
Advance Care Planning   Healthcare Decision Maker:    Primary Decision Maker: Kleber Alamo - [phone]    Secondary Decision Maker: Marcelo Alamo - [phone]    Click here to complete Healthcare Decision Makers including selection of the Healthcare Decision Maker Relationship (ie \"Primary\").

## 2025-04-28 NOTE — TELEPHONE ENCOUNTER
Patients son Kleber called. Brenda was admitted to hospital, she has a cancerous growth on her breast. They tried Haldol for agitation, it did not work, she pulled out her iv last night. Kleber says he will call with updates.      Please advise,  -Samira

## 2025-04-28 NOTE — CONSULTS
Comprehensive Nutrition Assessment    Type and Reason for Visit:  Initial, Wound, Consult    Nutrition Recommendations/Plan:   Continue current diet and ONS with all meals  Will continue inpatient monitoring POC/GOC     Malnutrition Assessment:  Malnutrition Status:  At risk for malnutrition (04/28/25 1420)    Context:  Chronic Illness     Findings of the 6 clinical characteristics of malnutrition:  Energy Intake:  Mild decrease in energy intake  Weight Loss:  No weight loss     Body Fat Loss:  No body fat loss     Muscle Mass Loss:  No muscle mass loss    Fluid Accumulation:  Unable to assess Extremities (+1 edema with multiple factors)   Strength:  Not Performed    Nutrition Assessment:    Pt admit 2/2 large left breast mass. Currently s/p bx by GS 4/28. PMHx=Right breast CA s/p lumpectomy/XRT in 2000, Alzheimer's dementia. Left breast has open, bleeding, malodorous area per wound consult. Will add Wound Healing ONS and Ensure Plus HP ONS BID to optimize nutrient intake in the setting of increased needs (wound, poss metastatic process)    Nutrition Related Findings:    A&Ox1, soft round abd +BS, +1 edema, I/O WNL Wound Type: Surgical Incision, Open Wounds       Current Nutrition Intake & Therapies:    Average Meal Intake: 0% (took 0% at breakfast today)  Average Supplements Intake: None Ordered  ADULT DIET; Regular  ADULT ORAL NUTRITION SUPPLEMENT; Breakfast, Dinner; Wound Healing Oral Supplement  ADULT ORAL NUTRITION SUPPLEMENT; Lunch, Dinner; Standard High Calorie/High Protein Oral Supplement    Anthropometric Measures:  Height: 149.9 cm (4' 11\")  Ideal Body Weight (IBW): 95 lbs (43 kg)    Admission Body Weight: 67.1 kg (147 lb 14.9 oz) (4/27 standing scale)  Current Body Weight: 65.4 kg (144 lb 2.9 oz), 151.8 % IBW. Weight Source: Bed scale (4/28)  Current BMI (kg/m2): 29.1  Usual Body Weight: 64.9 kg (143 lb 1.3 oz) (7/24/24 OV at Geriatric med)     % Weight Change (Calculated): 0.8                    BMI  Categories: Overweight (BMI 25.0-29.9)    Estimated Daily Nutrient Needs:  Energy Requirements Based On: Formula (MIfflin St. Jeor)  Weight Used for Energy Requirements: Current  Energy (kcal/day):   Weight Used for Protein Requirements: Ideal (98# = 45kg)  Protein (g/day): 60-70 (1.3-1.5 g/kg)  Method Used for Fluid Requirements: 1 ml/kcal  Fluid (ml/day):     Nutrition Diagnosis:   Inadequate oral intake related to cognitive or neurological impairment as evidenced by wounds, intake 0-25%    Nutrition Interventions:   Food and/or Nutrient Delivery: Continue Current Diet, Start Oral Nutrition Supplement  Nutrition Education/Counseling: Education/Counseling not appropriate  Coordination of Nutrition Care: Continue to monitor while inpatient       Goals:  Goals: PO intake 50% or greater, by next RD assessment  Type of Goal: New goal       Nutrition Monitoring and Evaluation:   Behavioral-Environmental Outcomes: None Identified  Food/Nutrient Intake Outcomes: Food and Nutrient Intake, Supplement Intake  Physical Signs/Symptoms Outcomes: Biochemical Data, GI Status, Fluid Status or Edema, Nutrition Focused Physical Findings, Skin, Weight    Discharge Planning:    Continue Oral Nutrition Supplement     Bridget Shannon RD, CNSC, LD  Contact: x [phone]

## 2025-04-28 NOTE — PROGRESS NOTES
Spiritual Health History and Assessment/Progress Note  [facility]    Initial Encounter, Spiritual/Emotional Needs, Palliative Care,  ,  ,      Name: Brenda Alamo MRN: [id number]    Age: 85 y.o.     Sex: female   Language: English   Jew: Rastafari   Left breast mass     Date: 4/28/2025                           Spiritual Assessment began in [facility] INTERMEDIATE 1        Referral/Consult From: Palliative Care   Encounter Overview/Reason: Initial Encounter, Spiritual/Emotional Needs, Palliative Care  Service Provided For: Patient and family together    [place], Belief, Meaning:   Patient identifies as spiritual, is connected with a haydee tradition or spiritual practice, and has beliefs or practices that help with coping during difficult times  Family/Friends identify as spiritual, are connected with a haydee tradition or spiritual practice, and have beliefs or practices that help with coping during difficult times      Importance and Influence:  Patient has spiritual/personal beliefs that influence decisions regarding their health  Family/Friends have spiritual/personal beliefs that influence decisions regarding the patient's health    Community:  Patient is connected with a spiritual community and feels well-supported. Support system includes: Children, [place] Community, and Extended family  Family/Friends are connected with a spiritual community: and feel well-supported. Support system includes: Parent/s, Children, [place] Community, and Extended family    Assessment and Plan of Care:     Patient Interventions include: Facilitated expression of thoughts and feelings, Explored spiritual coping/struggle/distress, Affirmed coping skills/support systems, and Provided sacramental/Congregation ritual  Family/Friends Interventions include: Facilitated expression of thoughts and feelings and Provided sacramental/Congregation ritual    Patient Plan of Care: Spiritual Care available upon further referral  Family/Friends

## 2025-04-28 NOTE — CONSULTS
[facility]  Hematology/Oncology  Consult      Patient Name: Brenda Alamo  YOB: 1940  PCP: Alfredito Hidalgo MD   Referring Provider:      Reason for Consultation:   Chief Complaint   Patient presents with    Wound Check     Left breast wound.         History of Present Illness: This is an 85-year-old female patient with a PMH of osteoporosis, Alzheimer disease, HTN, HLD who presented to the ED for evaluation of L breast wound. CT chest with 7.5 cm x 4.5 cm x 5.5 cm lobulated complex heterogeneous mass involving the left breast which extends to the adjacent cutaneous layer with extensive breast skin thickening and adjacent inflammatory stranding. Large masses/lymph nodes involving the left axilla with possible areas of necrosis and measuring up to 4.5 cm. Thickening of the left pectoralis major and minor muscle bellies. Findings highly worrisome for malignancy and targeted breast ultrasound as well as surgical consultation recommended. Multiple low-attenuation splenic lesions worrisome for metastatic disease. Multiple liver cyst with the largest measuring up to 2.0 cm.  Surgery was consulted and she is s/p a punch biopsy with pathology pending. Palliative following for goals of care.  Blood cultures were drawn. Blood work unremarkable. Consultation for new breast mass with mets.     Review of systems: Limited due to patient's dementia.      Diagnostic Data:     Past Medical History:   Diagnosis Date    Alzheimer disease (HCC)     Anxiety as acute reaction to gross stress 1/11/2017    Asymptomatic bilateral carotid artery stenosis 1/12/2017    HTN, goal below 140/80 1/11/2017    STRESS ??    Hyperlipidemia     Lipids blood increased 1/11/2017    Osteoporosis     Osteoporosis 1/11/2017       Patient Active Problem List    Diagnosis Date Noted    Osteoporosis 01/11/2017    Mixed hyperlipidemia 02/13/2023    Late onset Alzheimer's disease with behavioral disturbance (HCC) 09/21/2022    Left breast  to left breast TECHNOLOGIST PROVIDED HISTORY: Reason for exam:->Infiltrative mass to left breast Decision Support Exception - unselect if not a suspected or confirmed emergency medical condition->Emergency Medical Condition (MA) FINDINGS: Mediastinum: Thoracic aorta, heart and pericardium are normal.  No significant mediastinal adenopathy. Lungs/pleura: The lungs are clear.  No pleural effusions or pneumothorax. Upper Abdomen: 3.5 cm splenic cyst.  Multiple low-attenuation splenic lesions worrisome for metastatic disease.  Multiple liver cysts with the largest measuring up to 2.0 cm.  Hiatal hernia. Soft Tissues/Bones: 7.5 cm x 4.5 cm x 5.5 cm lobulated complex heterogeneous mass involving the left breast which extends to the adjacent cutaneous layer with extensive breast skin thickening and adjacent inflammatory stranding. Large masses/lymph nodes involving the left axilla with possible areas of necrosis and measuring up to 4.5 cm.  Thickening of the left pectoralis major and minor muscle bellies.  Findings highly worrisome for malignancy and targeted breast ultrasound as well as surgical consultation recommended. Degenerative changes thoracic spine with chronic appearing compression deformities.     1. 7.5 cm x 4.5 cm x 5.5 cm lobulated complex heterogeneous mass involving the left breast which extends to the adjacent cutaneous layer with extensive breast skin thickening and adjacent inflammatory stranding. Large masses/lymph nodes involving the left axilla with possible areas of necrosis and measuring up to 4.5 cm. Thickening of the left pectoralis major and minor muscle bellies. Findings highly worrisome for malignancy and targeted breast ultrasound as well as surgical consultation recommended. 2. Multiple low-attenuation splenic lesions worrisome for metastatic disease. 3. Multiple liver cysts with the largest measuring up to 2.0 cm. 4. Hiatal hernia.         ASSESSMENT/PLAN :   85-year-old female  -

## 2025-04-28 NOTE — CONSULTS
GENERAL SURGERY  CONSULT NOTE    Patient's Name/Date of Birth: Brenda Alamo / 1940    Date: April 28, 2025     PCP: Alfredito Hidalgo MD     Chief Complaint:   Chief Complaint   Patient presents with    Wound Check     Left breast wound.        Physician Consulted: Dr. Paniagua  Reason for Consult: infiltrative breast mass  Referring Physician: Dr. Elenita PATEL  Brenad Alamo is a 85 y.o. female who presents for evaluation of left breast wound.  Patient was brought in by her son after noticing some blood to her clothing.  Patient was then found to have a large mass to her left breast with some areas of drainage.  She is unable to state how long  the wound has been there.  She denies any associated symptoms such as nausea, vomiting, fevers, chills, weight loss.  Of note patient does have a history of right-sided breast cancer status post lumpectomy, axillary lymph node dissection as well as radiation therapy. Son is at bedside and does not know how long this has been present, unsure of last mammogram.     Past medical history significant for Alzheimer's disease, hypertension, hyperlipidemia.  Not currently on any blood thinning medications.    General surgery has been consulted secondary to left breast infiltrative mass seen on CT chest with concern for left breast malignancy with splenic metastasis.  CT abdomen and pelvis has not yet been completed.  Splenic lesions were seen on CT chest.  Vital signs remained stable.  Labs reviewed and overall noncontributory.      Past Medical History:   Diagnosis Date    Alzheimer disease (HCC)     Anxiety as acute reaction to gross stress 1/11/2017    Asymptomatic bilateral carotid artery stenosis 1/12/2017    HTN, goal below 140/80 1/11/2017    STRESS ??    Hyperlipidemia     Lipids blood increased 1/11/2017    Osteoporosis     Osteoporosis 1/11/2017       Past Surgical History:   Procedure Laterality Date    BREAST SURGERY  5/12/2000    Right axillary lymph node dissection and  input(s): \"PT\", \"PTT\"    RADIOLOGY  I have personally reviewed all relevant imaging      ASSESSMENT:      Patient Active Problem List   Diagnosis    Personal history of breast cancer    Osteoporosis    Asymptomatic bilateral carotid artery stenosis    Essential hypertension    MCI (mild cognitive impairment)    Late onset Alzheimer's disease with behavioral disturbance (HCC)    Mixed hyperlipidemia    Type 2 diabetes mellitus without complication, without long-term current use of insulin (HCC)    Nuclear senile cataract    Left breast mass    Lactic acid acidosis    Metastatic disease (HCC)       85 y.o. 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 Paniagua.    Katt Watson DO  General Surgery Resident, PGY-2    Electronically signed by Katt Watson DO on 4/28/25 at 4:39 AM EDT

## 2025-04-28 NOTE — PROGRESS NOTES
Verified w/ Dr. Watson that surgery is ok w/ patient's Left breast mass to be cleansed/irrigated w/ Dakins.

## 2025-04-28 NOTE — PROGRESS NOTES
Message sent via [employer] to Dr. Watson regarding if patient is ok for diet and also if there is an estimated time for patient's bedside punch biopsy today.    Dr. Watson stated ok for diet. Unsure of time for punch biopsy today yet.

## 2025-04-28 NOTE — PROGRESS NOTES
Spiritual Health History and Assessment/Progress Note  [facility]     Encounter, Rituals, Rites and Sacraments,  ,  ,      Name: Brenda Alamo MRN: [id number]    Age: 85 y.o.     Sex: female   Language: English   Zoroastrian: Congregation   Left breast mass     Date: 4/28/2025                           Spiritual Assessment began in [facility] 4S INTERMEDIATE 1        Referral/Consult From: Rounding   Encounter Overview/Reason:  Encounter, Rituals, Rites and Sacraments  Service Provided For: Patient    Haydee, Belief, Meaning:   Patient is connected with a haydee tradition or spiritual practice  Family/Friends are connected with a haydee tradition or spiritual practice      Importance and Influence:  Patient has no beliefs influential to healthcare decision-making identified during this visit  Family/Friends have no beliefs influential to healthcare decision-making identified during this visit    Community:  Patient feels well-supported. Support system includes: Children  Family/Friends feel well-supported. Support system includes: Other: Siblings    Assessment and Plan of Care:     Patient Interventions include: Facilitated expression of thoughts and feelings, Affirmed coping skills/support systems, and Provided sacramental/Advent ritual  Family/Friends Interventions include: Facilitated expression of thoughts and feelings and Affirmed coping skills/support systems    Patient Plan of Care: Spiritual Care available upon further referral  Family/Friends Plan of Care: Spiritual Care available upon further referral    Electronically signed by Chaplain Carroll on 4/28/2025 at 4:24 PM

## 2025-04-28 NOTE — CARE COORDINATION
Social Work/Discharge Planning:  Met with patient, her son Kleber and daughter Barbie and completed initial assessment.  Explained Social Work role and discussed transition of care/discharge planning.  Patient lives alone in a two story house.  PTA she is independent with no adaptive device.  She has no durable medical equipment.  Patient has no history at a skilled nursing facility.  Patient family is requesting rehab.  Plan is rehab at skilled nursing facility if approved by insurance or private pay at long term care or assisted living facility.  Provided Barbie with skilled nursing facility choice list to review.  Will continue to follow and assist with discharge planning.  Electronically signed by SOFI Morales on 4/28/2025 at 3:50 PM

## 2025-04-28 NOTE — FLOWSHEET NOTE
Inpatient Wound Care (initial consult) 430    Admit Date: 4/27/2025  5:08 PM    Reason for consult:  left breast    Patient is awake, alert and answers appropriately. Independent to roll. Daughter in law at bedside.    Significant history:  per General Surgery Consult note    Chief Complaint   Patient presents with    Wound Check       Left breast wound.       Physician Consulted: Dr. Paniagua  Reason for Consult: infiltrative breast mass  Referring Physician: Dr. Elenita PATEL  Brenda Alamo is a 85 y.o. female who presents for evaluation of left breast wound.  Patient was brought in by her son after noticing some blood to her clothing.  Patient was then found to have a large mass to her left breast with some areas of drainage.  She is unable to state how long  the wound has been there.  She denies any associated symptoms such as nausea, vomiting, fevers, chills, weight loss.  Of note patient does have a history of right-sided breast cancer status post lumpectomy, axillary lymph node dissection as well as radiation therapy. Son is at bedside and does not know how long this has been present, unsure of last mammogram.     Past Medical History:   Diagnosis Date    Alzheimer disease (HCC)     Anxiety as acute reaction to gross stress 1/11/2017    Asymptomatic bilateral carotid artery stenosis 1/12/2017    HTN, goal below 140/80 1/11/2017    STRESS ??    Hyperlipidemia     Lipids blood increased 1/11/2017    Osteoporosis     Osteoporosis 1/11/2017     Findings:     04/28/25 1023   Wound 04/27/25 Breast Left   Date First Assessed/Time First Assessed: 04/27/25 1910   Present on Original Admission: Yes  Wound Approximate Age at First Assessment (Weeks): 12 weeks  Primary Wound Type: (c) Other (Comments)  Location: Breast  Wound Location Orientation: Left   Wound Image    Wound Etiology Other  (suspicious growth)   Wound Cleansed Cleansed with saline   Dressing/Treatment ABD  (Adaptic, Opticell,)   Wound Length (cm) 12 cm    Wound Width (cm) 9.5 cm   Wound Depth (cm)   (unable to determine)   Wound Surface Area (cm^2) 114 cm^2   Wound Assessment Pink/red;Slough;Eschar dry   Drainage Amount Small (< 25%)   Drainage Description Clear   Odor Malodorous/putrid   Janessa-wound Assessment Blanchable erythema     Bilateral buttocks and both heels intact blanching     **Informed Consent**    The patient has given verbal consent to have photos taken of wound and inserted into their chart as part of their permanent medical record for purposes of documentation, treatment management and/or medical review.   All Images taken on 4/28/25 of patient name: Brenda Alamo were transmitted and stored on secured Epic  Site located within Media Folder Tab by a registered Epic-[employer] Mobile Application Device.     Plan: Cleanse with Dakin's/Adaptic/Opticell/ABD to left breast  Clear aid ointment to bilateral buttocks  Comfort glide  Wedges  Heel protectors  Dietary consult  General surgery consult - biopsy  Patient will need continued preventative care    Pratibha Goldstein RN 4/28/2025 1:17 PM

## 2025-04-28 NOTE — PROCEDURES
PROCEDURE NOTE  Date: 4/28/2025   Name: Brenda Alamo  YOB: 1940    After informed written consent was obtained, using Betadine for cleansing and 1% Lidocaine with epinephrine for anesthetic, with sterile technique a 5 mm punch biopsy was used to obtain a biopsy specimen of the lesion. Hemostasis was obtained by pressure and wound was sutured using 5-0 Nylon suture. Antibiotic dressing is applied, and wound care instructions provided. Be alert for any signs of cutaneous infection. The specimen is labeled and sent to pathology for evaluation. The procedure was well tolerated without complications.    Katt Watson  Surgical Resident PGY-2  Electronically signed by Katt Watson DO on 4/28/2025 at 11:10 AM

## 2025-04-28 NOTE — ED NOTES
ED to Inpatient Handoff Report    Notified 4S that electronic handoff available and patient ready for transport to room 430A.    Safety Risks: Risk of falls    Patient in Restraints: no    Constant Observer or Patient : no    Telemetry Monitoring Ordered :Yes           Order to transfer to unit without monitor:YES        Deterioration Index Score:   Predictive Model Details          27 (Normal)  Factor Value    Calculated 4/27/2025 23:27 49% Age 85 years old    Deterioration Index Model 34% Respiratory rate 23     9% Systolic 149     4% Potassium 4.2 mmol/L     2% Pulse 90     1% Sodium 138 mmol/L     0% Pulse oximetry 97 %     0% WBC count 5.4 k/uL     0% Temperature 98.1 °F (36.7 °C)     0% Hematocrit 37.4 %        Vitals:    04/27/25 2230 04/27/25 2245 04/27/25 2300 04/27/25 2315   BP: (!) 149/76      Pulse: 90      Resp:       Temp: 98.1 °F (36.7 °C)      TempSrc: Oral      SpO2: 96% 96% 94% 97%   Weight:       Height:             Opportunity for questions and clarification was provided during telephone report.

## 2025-04-28 NOTE — H&P
[facility]  History and Physical      CHIEF COMPLAINT:    Chief Complaint   Patient presents with    Wound Check     Left breast wound.         Patient of Alfredito Hidalgo MD presents with:  Left breast mass    History of Present Illness:   Ms. Alamo is an 85 year old  female with a past medical history of HTN, HLD, osteoporosis, anxiety, Alzheimer's Dementia, right breast CA status postlumpectomy historically.  Ms. Alamo presented to [facility] ED on 04/27/2025 with complaints of left breast wound.  Her son noticed blood on her clothing and found that she had a large mass to her left breast with areas of drainage.  Of note, the patient is a poor historian and was unable to state how long the wound has been there.  She denies accompanying fever, chills, weight loss.  Unknown as to when her last mammogram was.  Upon arrival to the ED her VS were oral temperature 98.1-106-17-97% RA-132/80.  CT of the chest with 7.5 cm x 4.5 cm x 5.5 cm lobulated complex heterogeneous mass involving the left breast which extends to the adjacent cutaneous layer with extensive breast skin thickening and adjacent inflammatory stranding.  Large masses/lymph nodes involving the left axilla with possible areas of necrosis and measuring up to 4.5 cm.  Thickening of the left pectoralis major and minor muscle bellies.  Findings highly worrisome for malignancy and targeted breast ultrasound as well as surgical consultation recommended.  Multiple low-attenuation splenic lesions worrisome for metastatic disease.  Multiple liver cyst with the largest measuring up to 2.0 cm.  Hiatal hernia.  Lactic acid 2.8.  She received a 500 cc NS bolus, Haldol 1 mg and was admitted to a telemetry monitored unit for continued management.  On evaluation she is laying in bed in no acute distress.  She denies any symptoms of pain or discomfort to me in her left breast.  TeleSitter is at bedside.  So is her daughter-in-law.  Patient is unable to  PM    LABGLOM >90 2025 02:50 AM    LABGLOM 33 2024 02:51 PM    GLUCOSE 135 2025 02:50 AM    CALCIUM 8.7 2025 02:50 AM    BILITOT 0.2 2025 05:45 PM    ALKPHOS 81 2025 05:45 PM    AST 18 2025 05:45 PM    ALT 13 2025 05:45 PM       Imagin2025 CT Chest W contrast:  1. 7.5 cm x 4.5 cm x 5.5 cm lobulated complex heterogeneous mass involving  the left breast which extends to the adjacent cutaneous layer with extensive  breast skin thickening and adjacent inflammatory stranding. Large  masses/lymph nodes involving the left axilla with possible areas of necrosis  and measuring up to 4.5 cm. Thickening of the left pectoralis major and minor  muscle bellies. Findings highly worrisome for malignancy and targeted breast  ultrasound as well as surgical consultation recommended.  2. Multiple low-attenuation splenic lesions worrisome for metastatic disease.  3. Multiple liver cysts with the largest measuring up to 2.0 cm.  4. Hiatal hernia.    EKG:  I've personally reviewed the patient's EKG:      Telemetry:  I've personally reviewed the patient's telemetry:  Sinus    ASSESSMENT:  Left breast wound  Infiltrative left breast mass on CT  Known Hx right breast CA, unknown last mammogram  Alzheimer's Dementia  Lactic acidosis: Resolved  HTN  HLD  Osteoporosis      PLAN:  Appearance of left breast is infiltrating inflammatory breast cancer until proven otherwise  Discussed with daughter-in-law at bedside and patient herself likely prognosis and need for intervention if she would like to go that route versus palliation  Appreciate General Surgery input, will need punch biopsy at bedside  Continue to monitor labs and vitals-essentially unremarkable  Oncology consult  Palliative care consulted for goals of care  Monitor VS  Monitor BMP, CBC  Continue IV hydration as she is now NPO         Code status: FULL  Requires Inpatient level of care    Dulce Wilder MD  2025

## 2025-04-28 NOTE — CONSULTS
Palliative Care Department  [phone]  Palliative Care Initial Consult  Cleveland Triplett MD    Brenda Alamo  [id number]  Hospital Day: 2  Location of Service: [facility]  Date of Initial Consult: 04/28/2025  Referring Provider: Katt Watson DO  Palliative Medicine was consulted for assistance with: Assist with goals of care    ASSESSMENT & PLAN:       New Left Breast mass with metastasis  Hx of right breast cancer in 2000  CT finding highly suggestive left breast malignancy with metastasis to lymph node, spleen and liver  s/p a punch biopsy with pathology pending.   Oncology rec PET scan outpatient  As per oncology, limited options of treatment due to advanced diabetes and performance    Alzheimer disease    Anxiety/Agitation  Start Lorazepam 0.5 mg Q 6H PRN  Start Seroquel 25 mg Q6H PRN    Palliative Care Encounter  At this time, patient does not have capacity for medical decision-making  During encounter, Kleber Soliman was the surrogate medical decision-making  Will continue to follow for ongoing monitoring of progression of Pain, Anxiety, and Agitation as well as for appropriateness for hospice care due to Cancer  Will continue to evaluate test results related to and response to treatment of Cancer and medication effectiveness for Pain, Anxiety, and Agitation,  Will obtain testing as needed to monitor medication results:N/A  Will continue to assess patient status including monitor for side effects of treatments-Lorazepam  Ongoing counseling of patient and family regarding diagnoses and prognosis of Cancer  Will continue treatment including ativan and seroquel    Goals of care: To Be Determined  Surrogate: Child  Prognosis: depends upon goals  Spiritual assessment: No spiritual distress identified   Bereavement and grief: Low Risk Bereavement    Advance Care Planning Documents:    Healthcare Power of : Completed  Financial Power of : Completed  Living Will: Completed  Code

## 2025-04-28 NOTE — PROGRESS NOTES
4 Eyes Skin Assessment     NAME:  Brenda Alamo  YOB: 1940  MEDICAL RECORD NUMBER:  [id number]    The patient is being assessed for  Admission    I agree that at least one RN has performed a thorough Head to Toe Skin Assessment on the patient. ALL assessment sites listed below have been assessed.      Areas assessed by both nurses:    Head, Face, Ears, Shoulders, Back, Chest, Arms, Elbows, Hands, Sacrum. Buttock, Coccyx, Ischium, and Legs. Feet and Heels        Does the Patient have a Wound? Yes wound(s) were present on assessment. LDA wound assessment was Initiated and completed by RN       Jaret Prevention initiated by RN: No  Wound Care Orders initiated by RN: No- waiting for oncology consult    Pressure Injury (Stage 3,4, Unstageable, DTI, NWPT, and Complex wounds) if present, place Wound referral order by RN under : No    New Ostomies, if present place, Ostomy referral order under : No     Nurse 1 eSignature: Electronically signed by Rizwana Gabriel RN on 4/28/25 at 12:34 AM EDT    **SHARE this note so that the co-signing nurse can place an eSignature**    Nurse 2 eSignature: Electronically signed by Hoang Elliott RN on 4/28/25 at 12:36 AM EDT

## 2025-04-29 PROBLEM — F09 COGNITIVE DYSFUNCTION: Status: ACTIVE | Noted: 2020-07-09

## 2025-04-29 PROBLEM — R00.0 SINUS TACHYCARDIA: Status: ACTIVE | Noted: 2025-04-29

## 2025-04-29 LAB
ANION GAP SERPL CALCULATED.3IONS-SCNC: 18 MMOL/L (ref 7–16)
BASOPHILS # BLD: 0.04 K/UL (ref 0–0.2)
BASOPHILS NFR BLD: 1 % (ref 0–2)
BUN SERPL-MCNC: 11 MG/DL (ref 6–23)
CALCIUM SERPL-MCNC: 9.1 MG/DL (ref 8.6–10.2)
CHLORIDE SERPL-SCNC: 100 MMOL/L (ref 98–107)
CO2 SERPL-SCNC: 18 MMOL/L (ref 22–29)
CREAT SERPL-MCNC: 0.5 MG/DL (ref 0.5–1)
EKG ATRIAL RATE: 108 BPM
EKG P AXIS: 40 DEGREES
EKG P-R INTERVAL: 144 MS
EKG Q-T INTERVAL: 348 MS
EKG QRS DURATION: 80 MS
EKG QTC CALCULATION (BAZETT): 466 MS
EKG R AXIS: 28 DEGREES
EKG T AXIS: 25 DEGREES
EKG VENTRICULAR RATE: 108 BPM
EOSINOPHIL # BLD: 0.07 K/UL (ref 0.05–0.5)
EOSINOPHILS RELATIVE PERCENT: 1 % (ref 0–6)
ERYTHROCYTE [DISTWIDTH] IN BLOOD BY AUTOMATED COUNT: 14.3 % (ref 11.5–15)
GFR, ESTIMATED: >90 ML/MIN/1.73M2
GLUCOSE SERPL-MCNC: 157 MG/DL (ref 74–99)
HCT VFR BLD AUTO: 39.1 % (ref 34–48)
HGB BLD-MCNC: 11.9 G/DL (ref 11.5–15.5)
IMM GRANULOCYTES # BLD AUTO: 0.03 K/UL (ref 0–0.58)
IMM GRANULOCYTES NFR BLD: 0 % (ref 0–5)
LYMPHOCYTES NFR BLD: 1.33 K/UL (ref 1.5–4)
LYMPHOCYTES RELATIVE PERCENT: 17 % (ref 20–42)
MCH RBC QN AUTO: 30.1 PG (ref 26–35)
MCHC RBC AUTO-ENTMCNC: 30.4 G/DL (ref 32–34.5)
MCV RBC AUTO: 98.7 FL (ref 80–99.9)
MONOCYTES NFR BLD: 0.81 K/UL (ref 0.1–0.95)
MONOCYTES NFR BLD: 11 % (ref 2–12)
NEUTROPHILS NFR BLD: 70 % (ref 43–80)
NEUTS SEG NFR BLD: 5.38 K/UL (ref 1.8–7.3)
PLATELET # BLD AUTO: 269 K/UL (ref 130–450)
PMV BLD AUTO: 9.7 FL (ref 7–12)
POTASSIUM SERPL-SCNC: 3.9 MMOL/L (ref 3.5–5)
RBC # BLD AUTO: 3.96 M/UL (ref 3.5–5.5)
SODIUM SERPL-SCNC: 136 MMOL/L (ref 132–146)
WBC OTHER # BLD: 7.7 K/UL (ref 4.5–11.5)

## 2025-04-29 PROCEDURE — APPSS60 APP SPLIT SHARED TIME 46-60 MINUTES: Performed by: NURSE PRACTITIONER

## 2025-04-29 PROCEDURE — 93010 ELECTROCARDIOGRAM REPORT: CPT | Performed by: INTERNAL MEDICINE

## 2025-04-29 PROCEDURE — 6360000002 HC RX W HCPCS

## 2025-04-29 PROCEDURE — 85025 COMPLETE CBC W/AUTO DIFF WBC: CPT

## 2025-04-29 PROCEDURE — 2060000000 HC ICU INTERMEDIATE R&B

## 2025-04-29 PROCEDURE — 99232 SBSQ HOSP IP/OBS MODERATE 35: CPT | Performed by: INTERNAL MEDICINE

## 2025-04-29 PROCEDURE — 93005 ELECTROCARDIOGRAM TRACING: CPT | Performed by: NURSE PRACTITIONER

## 2025-04-29 PROCEDURE — 36415 COLL VENOUS BLD VENIPUNCTURE: CPT

## 2025-04-29 PROCEDURE — 97535 SELF CARE MNGMENT TRAINING: CPT | Performed by: INTERNAL MEDICINE

## 2025-04-29 PROCEDURE — 2500000003 HC RX 250 WO HCPCS: Performed by: NURSE PRACTITIONER

## 2025-04-29 PROCEDURE — 6370000000 HC RX 637 (ALT 250 FOR IP)

## 2025-04-29 PROCEDURE — 6370000000 HC RX 637 (ALT 250 FOR IP): Performed by: INTERNAL MEDICINE

## 2025-04-29 PROCEDURE — 97161 PT EVAL LOW COMPLEX 20 MIN: CPT

## 2025-04-29 PROCEDURE — 80048 BASIC METABOLIC PNL TOTAL CA: CPT

## 2025-04-29 RX ORDER — HALOPERIDOL 5 MG/ML
5 INJECTION INTRAMUSCULAR ONCE
Status: COMPLETED | OUTPATIENT
Start: 2025-04-29 | End: 2025-04-29

## 2025-04-29 RX ADMIN — HALOPERIDOL LACTATE 5 MG: 5 INJECTION, SOLUTION INTRAMUSCULAR at 18:26

## 2025-04-29 RX ADMIN — SODIUM HYPOCHLORITE: 1.25 SOLUTION TOPICAL at 08:36

## 2025-04-29 RX ADMIN — SODIUM CHLORIDE, PRESERVATIVE FREE 10 ML: 5 INJECTION INTRAVENOUS at 08:36

## 2025-04-29 RX ADMIN — LORAZEPAM 0.5 MG: 0.5 TABLET ORAL at 08:35

## 2025-04-29 RX ADMIN — QUETIAPINE FUMARATE 25 MG: 25 TABLET ORAL at 15:38

## 2025-04-29 RX ADMIN — LORAZEPAM 0.5 MG: 0.5 TABLET ORAL at 15:38

## 2025-04-29 NOTE — PROGRESS NOTES
Notified Virginie Armijo w/ palliative of PO ativan, PO Seroquel, and IV ativan from last night have all been ineffective for patient's agitation, along w/ patient being awake for past few days and currently still very agitated and thrashing while eyes are closed. Notified her that family stated haldol appeared to have worked better for her a few days ago but patient is unable to take oral route of medication at this time.

## 2025-04-29 NOTE — PROGRESS NOTES
[facility]                                Progress note    Subjective:  No complaints    Objective:  Sitting up in a chair, dozing off  Multiple family members present at the bedside, state concern for agitation unchanged and at baseline  Awaiting placement  BP (!) 155/94   Pulse (!) 113   Temp 98.1 °F (36.7 °C) (Axillary)   Resp 20   Ht 1.499 m (4' 11\")   Wt 65.2 kg (143 lb 12.8 oz)   SpO2 96%   BMI 29.04 kg/m²   CONST:  Well developed, well nourished elderly  female who appears stated age. Awake, alert, cooperative, no apparent distress  HEENT:   Head- Normocephalic, atraumatic   Eyes- Conjunctivae pink, anicteric  Throat- Oral mucosa pink and moist  Neck-  No stridor, trachea midline, no jugular venous distention. No adenopathy   CHEST: Chest symmetrical and non-tender to palpation. No accessory muscle use or intercostal retractions  RESPIRATORY: Lung sounds - clear throughout fields   CARDIOVASCULAR:     No carotid bruit  Heart Inspection- shows no noted pulsations  Heart Palpation- no heaves or thrills; PMI is non-displaced   Heart Ausculation- Regular rate and rhythm, no murmur. No s3, s4 or rub   PV: No lower extremity edema. No varicosities. Pedal pulses palpable, no clubbing or cyanosis   ABDOMEN: Soft, non-tender to light palpation. Bowel sounds present. No palpable masses no organomegaly; no abdominal bruit  MS: Good muscle strength and tone. No atrophy or abnormal movements.   : Deferred  SKIN: Warm and dry no statis dermatitis or ulcers   NEURO / PSYCH: Orientation not assessed speech clear and appropriate. Follows all commands.  Flat affect      Recent Labs     04/27/25 1745 04/28/25  0250 04/29/25 0447   WBC 5.4 5.9 7.7   HGB 12.0 11.5 11.9   HCT 37.4 36.3 39.1    211 269       Recent Labs     04/27/25 1745 04/28/25  0250 04/29/25  0447    137 136   K 4.2 4.0 3.9    103 100   CO2 23 20* 18*   BUN 17 13 11   CREATININE 0.6 0.5 0.5   CALCIUM

## 2025-04-29 NOTE — DISCHARGE INSTR - COC
Continuity of Care Form    Patient Name: Brenda Alamo   :  1940  MRN:  [id number]    Admit date:  2025  Discharge date:  2025    Code Status Order: Full Code   Advance Directives:     Admitting Physician:  Dulce Wilder MD  PCP: Alfredito Hidalgo MD    Discharging Nurse: RT  Discharging Hospital Unit/Room#: 0430/0430-A  Discharging Unit Phone Number: [phone]    Emergency Contact:   Extended Emergency Contact Information  Primary Emergency Contact: Kleber Alamo  Address: [facility]  Mobile Phone: [phone]  Relation: Child   needed? No  Secondary Emergency Contact: Marcelo Alamo  Mobile Phone: [phone]  Relation: Child   needed? 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    DILATION AND CURETTAGE OF UTERUS  3/20/2002    uterine fibroids x 2    FIXATION KYPHOPLASTY  2017    t7    SPINE SURGERY N/A 2021    T10 KYPHOPLASTY---C ARM--[employer]TR[employer] performed by Fernandez Anne MD at [facility] [place]       Immunization History:   Immunization History   Administered Date(s) Administered    COVID-19, PFIZER PURPLE top, DILUTE for use, (age 12 y+), 30mcg/0.3mL 2021, 2021    TDaP, ADACEL (age 10y-64y), BOOSTRIX (age 10y+), IM, 0.5mL 2018       Active Problems:  Patient Active Problem List   Diagnosis Code    Personal history of breast cancer Z85.3    Osteoporosis M81.0    Asymptomatic bilateral carotid artery stenosis I65.23    Essential hypertension I10    MCI (mild cognitive impairment) G31.84    Late onset Alzheimer's disease with behavioral disturbance (HCC) G30.1, F02.818    Mixed hyperlipidemia E78.2    Type 2 diabetes mellitus without complication, without long-term current use of insulin (HCC) E11.9    Nuclear senile cataract H25.10    Left breast mass N63.20    Lactic acid  No change in H&P    PHYSICIAN SIGNATURE:  Electronically signed by BOAZ Car CNP on 4/30/25 at 2:03 PM EDT

## 2025-04-29 NOTE — CARE COORDINATION
Social Work/Discharge Planning:  Received family facility choices of first [facility], 2nd-[facility], 3rd-[facility] and 4th-[facility].  Referral made to liaines Morales with [facility] and facility can accept patient, but will need pre-cert.  Therapy to evaluate.  Provided update to patient son Kleber and daughter Barbie.  Will continue to follow.  Electronically signed by SOFI Morales on 4/29/2025 at 9:53 AM

## 2025-04-29 NOTE — PROGRESS NOTES
Paged Minnie Fox-VALE regarding patient's HR in the 120s/130s persistently and notified her of patient's labile Bps t/o the day.

## 2025-04-29 NOTE — PLAN OF CARE
Problem: Safety - Adult  Goal: Free from fall injury  Outcome: Progressing  Flowsheets (Taken 4/28/2025 1945)  Free From Fall Injury: Instruct family/caregiver on patient safety     Problem: Chronic Conditions and Co-morbidities  Goal: Patient's chronic conditions and co-morbidity symptoms are monitored and maintained or improved  Outcome: Progressing  Flowsheets (Taken 4/28/2025 1945)  Care Plan - Patient's Chronic Conditions and Co-Morbidity Symptoms are Monitored and Maintained or Improved:   Monitor and assess patient's chronic conditions and comorbid symptoms for stability, deterioration, or improvement   Collaborate with multidisciplinary team to address chronic and comorbid conditions and prevent exacerbation or deterioration   Update acute care plan with appropriate goals if chronic or comorbid symptoms are exacerbated and prevent overall improvement and discharge     Problem: Discharge Planning  Goal: Discharge to home or other facility with appropriate resources  Outcome: Progressing  Flowsheets (Taken 4/28/2025 1945)  Discharge to home or other facility with appropriate resources:   Identify barriers to discharge with patient and caregiver   Identify discharge learning needs (meds, wound care, etc)   Refer to discharge planning if patient needs post-hospital services based on physician order or complex needs related to functional status, cognitive ability or social support system     Problem: ABCDS Injury Assessment  Goal: Absence of physical injury  Outcome: Progressing  Flowsheets (Taken 4/28/2025 1945)  Absence of Physical Injury: Implement safety measures based on patient assessment     Problem: Skin/Tissue Integrity  Goal: Skin integrity remains intact  Description: 1.  Monitor for areas of redness and/or skin breakdown2.  Assess vascular access sites hourly3.  Every 4-6 hours minimum:  Change oxygen saturation probe site4.  Every 4-6 hours:  If on nasal continuous positive airway pressure,  respiratory therapy assess nares and determine need for appliance change or resting period  Outcome: Progressing  Flowsheets (Taken 4/28/2025 1945)  Skin Integrity Remains Intact: Monitor for areas of redness and/or skin breakdown     Problem: Nutrition Deficit:  Goal: Optimize nutritional status  4/28/2025 2203 by Kimberlee Goldstein, RN  Outcome: Progressing  4/28/2025 1421 by Bridget Shannon, RD, CNSC, LD  Flowsheets (Taken 4/28/2025 1421)  Nutrient intake appropriate for improving, restoring, or maintaining nutritional needs:   Assess nutritional status and recommend course of action   Monitor oral intake, labs, and treatment plans   Recommend appropriate diets, oral nutritional supplements, and vitamin/mineral supplements

## 2025-04-29 NOTE — PROGRESS NOTES
Received message from nurse, family concerned about patient increased agitation, thrashing while eyes closed in bed, does not feel Ativan or Seroquel has not been beneficial, requesting Haldol.  Case discussed with Dr. Jackson, one-time dose IM Haldol placed.  Patient will be seen in the morning to reassess her needs.

## 2025-04-29 NOTE — PROGRESS NOTES
Paged Dr. Rangel regarding family requesting to see him to discuss patient's status and plan of care.

## 2025-04-29 NOTE — CONSULTS
difficulties or concerns arise.    I have participated in a substantive portion of the present encounter inclusive of a component of independent history and examination in addition to that of medical decision making regarding patient care.    Thank you for allowing me to participate in your patient's care. Please feel free to contact me if you have any questions or concerns.    Yash Claros MD  [facility] Cardiology

## 2025-04-29 NOTE — PROGRESS NOTES
she might have limited options for treatment. However, they are wanting for oncology input. I discussed with them code status. They don't want cardiac resuscitations or intubation. All questions answered. Emotional support provided. We will continue to follow with clinical course and symptoms denise.     ROS:   See palliative care ROS/ESAS below; see HPI    Review of Systems   All other systems reviewed and are negative.        Past Medical History:   Diagnosis Date    Alzheimer disease (HCC)     Anxiety as acute reaction to gross stress 1/11/2017    Asymptomatic bilateral carotid artery stenosis 1/12/2017    HTN, goal below 140/80 1/11/2017    STRESS ??    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    DILATION AND CURETTAGE OF UTERUS  3/20/2002    uterine fibroids x 2    FIXATION KYPHOPLASTY  01/12/2017    t7    SPINE SURGERY N/A 9/30/2021    T10 KYPHOPLASTY---C ARM--[employer]TR[employer]IC performed by Fernandez Anne MD at [facility] [place]       History reviewed. No pertinent family history.    Unable to obtain family history due to N/A- family history available    Allergies   Allergen Reactions    Alendronate Sodium        Social History     Tobacco Use    Smoking status: Never    Smokeless tobacco: Never   Vaping Use    Vaping status: Never Used   Substance Use Topics    Alcohol use: No    Drug use: No       Social history:  [place] status: no  Marital status:   Living status: alone       OBJECTIVE:     Physical Exam:  BP (!) 155/94   Pulse (!) 113   Temp 98.1 °F (36.7 °C) (Axillary)   Resp 20   Ht 1.499 m (4' 11\")   Wt 65.2 kg (143 lb 12.8 oz)   SpO2 96%   BMI 29.04 kg/m²   Physical Exam  Cardiovascular:      Rate and Rhythm: Normal rate.       Pulses: Normal pulses.   Pulmonary:      Effort: Pulmonary effort is normal.      Breath sounds: Normal breath sounds.   Abdominal:      General: Abdomen is flat.      Palpations: Abdomen is soft.   Skin:     General: Skin is warm.   Neurological:      Mental Status: She is alert. She is disoriented.           Objective data reviewed: labs, images, records, medication use, vitals, and chart        All other systems were reviewed and are negative.    Current Medications:  Inpatient medications reviewed: yes  Home Medications reviewed: yes  OARRS Reviewed: NOT REVIEWED  24 Hour PRN Meds: no PRN medications in 24 hours      Note: This report was completed using computerParadial voiced recognition software.  Every effort has been made to ensure accuracy; however, inadvertent computerized transcription errors may be present.

## 2025-04-30 LAB
ANION GAP SERPL CALCULATED.3IONS-SCNC: 15 MMOL/L (ref 7–16)
BASOPHILS # BLD: 0.02 K/UL (ref 0–0.2)
BASOPHILS NFR BLD: 0 % (ref 0–2)
BUN SERPL-MCNC: 23 MG/DL (ref 6–23)
CALCIUM SERPL-MCNC: 9.1 MG/DL (ref 8.6–10.2)
CHLORIDE SERPL-SCNC: 97 MMOL/L (ref 98–107)
CO2 SERPL-SCNC: 22 MMOL/L (ref 22–29)
CREAT SERPL-MCNC: 0.5 MG/DL (ref 0.5–1)
EOSINOPHIL # BLD: 0.13 K/UL (ref 0.05–0.5)
EOSINOPHILS RELATIVE PERCENT: 2 % (ref 0–6)
ERYTHROCYTE [DISTWIDTH] IN BLOOD BY AUTOMATED COUNT: 14.6 % (ref 11.5–15)
GFR, ESTIMATED: >90 ML/MIN/1.73M2
GLUCOSE SERPL-MCNC: 134 MG/DL (ref 74–99)
HCT VFR BLD AUTO: 36.1 % (ref 34–48)
HGB BLD-MCNC: 11.4 G/DL (ref 11.5–15.5)
IMM GRANULOCYTES # BLD AUTO: 0.04 K/UL (ref 0–0.58)
IMM GRANULOCYTES NFR BLD: 1 % (ref 0–5)
LYMPHOCYTES NFR BLD: 0.94 K/UL (ref 1.5–4)
LYMPHOCYTES RELATIVE PERCENT: 12 % (ref 20–42)
MCH RBC QN AUTO: 30.1 PG (ref 26–35)
MCHC RBC AUTO-ENTMCNC: 31.6 G/DL (ref 32–34.5)
MCV RBC AUTO: 95.3 FL (ref 80–99.9)
MONOCYTES NFR BLD: 0.93 K/UL (ref 0.1–0.95)
MONOCYTES NFR BLD: 12 % (ref 2–12)
NEUTROPHILS NFR BLD: 73 % (ref 43–80)
NEUTS SEG NFR BLD: 5.53 K/UL (ref 1.8–7.3)
PLATELET # BLD AUTO: 245 K/UL (ref 130–450)
PMV BLD AUTO: 9 FL (ref 7–12)
POTASSIUM SERPL-SCNC: 3.8 MMOL/L (ref 3.5–5)
RBC # BLD AUTO: 3.79 M/UL (ref 3.5–5.5)
SODIUM SERPL-SCNC: 134 MMOL/L (ref 132–146)
WBC OTHER # BLD: 7.6 K/UL (ref 4.5–11.5)

## 2025-04-30 PROCEDURE — 97165 OT EVAL LOW COMPLEX 30 MIN: CPT

## 2025-04-30 PROCEDURE — 6370000000 HC RX 637 (ALT 250 FOR IP): Performed by: STUDENT IN AN ORGANIZED HEALTH CARE EDUCATION/TRAINING PROGRAM

## 2025-04-30 PROCEDURE — 2500000003 HC RX 250 WO HCPCS: Performed by: NURSE PRACTITIONER

## 2025-04-30 PROCEDURE — 93005 ELECTROCARDIOGRAM TRACING: CPT | Performed by: NURSE PRACTITIONER

## 2025-04-30 PROCEDURE — 6360000002 HC RX W HCPCS

## 2025-04-30 PROCEDURE — 97535 SELF CARE MNGMENT TRAINING: CPT

## 2025-04-30 PROCEDURE — 85025 COMPLETE CBC W/AUTO DIFF WBC: CPT

## 2025-04-30 PROCEDURE — 97530 THERAPEUTIC ACTIVITIES: CPT

## 2025-04-30 PROCEDURE — 2060000000 HC ICU INTERMEDIATE R&B

## 2025-04-30 PROCEDURE — 36415 COLL VENOUS BLD VENIPUNCTURE: CPT

## 2025-04-30 PROCEDURE — 80048 BASIC METABOLIC PNL TOTAL CA: CPT

## 2025-04-30 PROCEDURE — 2580000003 HC RX 258: Performed by: NURSE PRACTITIONER

## 2025-04-30 RX ORDER — LORAZEPAM 0.5 MG/1
0.5 TABLET ORAL EVERY 6 HOURS PRN
Qty: 12 TABLET | Refills: 0 | Status: SHIPPED | OUTPATIENT
Start: 2025-04-30 | End: 2025-05-03

## 2025-04-30 RX ORDER — HALOPERIDOL 2 MG/ML
1 SOLUTION ORAL 2 TIMES DAILY
Status: DISCONTINUED | OUTPATIENT
Start: 2025-04-30 | End: 2025-05-01 | Stop reason: HOSPADM

## 2025-04-30 RX ORDER — 0.9 % SODIUM CHLORIDE 0.9 %
250 INTRAVENOUS SOLUTION INTRAVENOUS ONCE
Status: COMPLETED | OUTPATIENT
Start: 2025-04-30 | End: 2025-04-30

## 2025-04-30 RX ORDER — SODIUM HYPOCHLORITE 1.25 MG/ML
SOLUTION TOPICAL
Qty: 30 EACH | Refills: 3 | Status: SHIPPED | OUTPATIENT
Start: 2025-04-30

## 2025-04-30 RX ORDER — HALOPERIDOL 5 MG/ML
1 INJECTION INTRAMUSCULAR EVERY 6 HOURS PRN
Status: DISCONTINUED | OUTPATIENT
Start: 2025-04-30 | End: 2025-05-01 | Stop reason: HOSPADM

## 2025-04-30 RX ORDER — METOPROLOL TARTRATE 1 MG/ML
2.5 INJECTION, SOLUTION INTRAVENOUS EVERY 6 HOURS PRN
Status: DISCONTINUED | OUTPATIENT
Start: 2025-04-30 | End: 2025-05-01 | Stop reason: HOSPADM

## 2025-04-30 RX ADMIN — SODIUM CHLORIDE 250 ML: 0.9 INJECTION, SOLUTION INTRAVENOUS at 21:06

## 2025-04-30 RX ADMIN — HALOPERIDOL LACTATE 1 MG: 5 INJECTION, SOLUTION INTRAMUSCULAR at 17:01

## 2025-04-30 RX ADMIN — SODIUM HYPOCHLORITE: 1.25 SOLUTION TOPICAL at 07:46

## 2025-04-30 RX ADMIN — METOPROLOL TARTRATE 2.5 MG: 5 INJECTION INTRAVENOUS at 21:11

## 2025-04-30 RX ADMIN — HALOPERIDOL 1 MG: 2 SOLUTION ORAL at 22:06

## 2025-04-30 ASSESSMENT — PAIN SCALES - GENERAL
PAINLEVEL_OUTOF10: 0

## 2025-04-30 ASSESSMENT — PAIN SCALES - WONG BAKER: WONGBAKER_NUMERICALRESPONSE: NO HURT

## 2025-04-30 NOTE — PROGRESS NOTES
Attempted to call [facility] for nurse to nurse report. No answer at this time.

## 2025-04-30 NOTE — PROGRESS NOTES
Patient received dose of IM Haldol. 2 RN skin check was performed and patient was put back into bed and explained the side effects of receiving Haldol. Patient's family at bedside during education. This RN received call from [facility] stating patient's HR is sustaining in 140s and patient was found walking around hallway with daughter. Educated, again, patient's family and patient of why it is important to remain in bed.

## 2025-04-30 NOTE — PLAN OF CARE
Problem: Safety - Adult  Goal: Free from fall injury  4/30/2025 0856 by Arden Roman RN  Outcome: Progressing     Problem: Chronic Conditions and Co-morbidities  Goal: Patient's chronic conditions and co-morbidity symptoms are monitored and maintained or improved  4/30/2025 0856 by Arden Roman RN  Outcome: Progressing     Problem: Discharge Planning  Goal: Discharge to home or other facility with appropriate resources  4/30/2025 0856 by Arden Roman RN  Outcome: Progressing     Problem: ABCDS Injury Assessment  Goal: Absence of physical injury  4/30/2025 0856 by Arden Roman RN  Outcome: Progressing     Problem: Skin/Tissue Integrity  Goal: Skin integrity remains intact  Description: 1.  Monitor for areas of redness and/or skin breakdown2.  Assess vascular access sites hourly3.  Every 4-6 hours minimum:  Change oxygen saturation probe site4.  Every 4-6 hours:  If on nasal continuous positive airway pressure, respiratory therapy assess nares and determine need for appliance change or resting period  4/30/2025 0856 by Arden Roman RN  Outcome: Progressing     Problem: Nutrition Deficit:  Goal: Optimize nutritional status  4/30/2025 0856 by Arden Roman RN  Outcome: Progressing     Problem: Pain  Goal: Verbalizes/displays adequate comfort level or baseline comfort level  4/30/2025 0856 by Arden Roman RN  Outcome: Progressing

## 2025-04-30 NOTE — PROGRESS NOTES
Physical Therapy  Facility/Department: [address] INTERMEDIATE 1  Daily Treatment Note  NAME: Brenda Alamo  : 1940  MRN: [id number]    Date of Service: 2025    Patient Diagnosis(es): The primary encounter diagnosis was Mass of lower outer quadrant of left breast. Diagnoses of Alzheimer's dementia, unspecified dementia severity, unspecified timing of dementia onset, unspecified whether behavioral, psychotic, or mood disturbance or anxiety (HCC) and Splenic lesion were also pertinent to this visit.      Evaluating Therapist: Katina Leiva PT        Room #:  0430/0430-A  Diagnosis:  Splenic lesion [D73.89]  Left breast mass [N63.20]  Mass of lower outer quadrant of left breast [N63.23]  Alzheimer's dementia, unspecified dementia severity, unspecified timing of dementia onset, unspecified whether behavioral, psychotic, or mood disturbance or anxiety (HCC) [G30.9, F02.80]  PMHx/PSHx:  Alzheimer disease, HTN, osteoporosis   Precautions:  falls, alarm     Social:  Pt lives alone in a 2 floor plan independent without device. Family provides assistance.        Initial Evaluation  Date: 25 Treatment      Short Term/ Long Term   Goals   Was pt agreeable to Eval/treatment? yes  family present     Does pt have pain? No c/o pain  no c/o pain     Bed Mobility  Rolling: NT  Supine to sit: NT  Sit to supine: NT  Scooting: NT  NT - pt sitting up in the chair independent   Transfers Sit to stand: CGA  Stand to sit: CGA  Stand pivot: CGA  sit to stand:  CGA  Stand to sit:  Min A  Stand pivot:  NT independent   Ambulation    75 feet x2  with no device CGA to min assist  200 feet with HHA Min A.  150 feet with no device with supervison   Stair Negotiation  Ascended and descended  NT  NT  4-12 steps with 1 rail with supervision   LE strength    Grossly 3/5     4/5   balance      fair       AM-PAC Raw score                        Patient education  Pt educated on PT objectives during treatment session, safety

## 2025-04-30 NOTE — PROGRESS NOTES
Palliative Care Department  [phone]  Palliative Care Progress Note  Cleveland Triplett MD    Brenda Alamo  [id number]  Hospital Day: 4  Location of Service: [facility]  Date of Initial Consult: 04/28/2025  Referring Provider: Katt Watson DO  Palliative Medicine was consulted for assistance with: Assist with goals of care    ASSESSMENT & PLAN:       New Left Breast mass with metastasis  Hx of right breast cancer in 2000  CT finding highly suggestive left breast malignancy with metastasis to lymph node, spleen and liver  s/p a punch biopsy with pathology pending.   Oncology rec PET scan outpatient  As per oncology, limited options of treatment due to advanced diabetes and performance    Alzheimer disease    Anxiety/Agitation  Continue Lorazepam 0.5 mg Q 6H PRN  Start Haldol 1 mg BID around the clock  Start Haldol 1 mg Q6H PRN    Insomnia  Start melatonin 3 mg HS PRN  Continue Lorazepam 0.5 PRN    Palliative Care Encounter  At this time, patient does not have capacity for medical decision-making  During encounter, Kleber Soliman was the surrogate medical decision-making  Will continue to follow for ongoing monitoring of progression of Pain, Anxiety, and Agitation as well as for appropriateness for hospice care due to Cancer  Will continue to evaluate test results related to and response to treatment of Cancer and medication effectiveness for Pain, Anxiety, and Agitation,  Will obtain testing as needed to monitor medication results:N/A  Will continue to assess patient status including monitor for side effects of treatments-Lorazepam  Ongoing counseling of patient and family regarding diagnoses and prognosis of Cancer  Will continue treatment including ativan and seroquel    Goals of care: To Be Determined  Surrogate: Child  Prognosis: depends upon goals  Spiritual assessment: No spiritual distress identified   Bereavement and grief: Low Risk Bereavement    Advance Care Planning Documents:

## 2025-04-30 NOTE — PLAN OF CARE
Problem: Safety - Adult  Goal: Free from fall injury  Outcome: Progressing     Problem: Chronic Conditions and Co-morbidities  Goal: Patient's chronic conditions and co-morbidity symptoms are monitored and maintained or improved  Outcome: Progressing     Problem: Discharge Planning  Goal: Discharge to home or other facility with appropriate resources  Outcome: Progressing     Problem: ABCDS Injury Assessment  Goal: Absence of physical injury  Outcome: Progressing     Problem: Skin/Tissue Integrity  Goal: Skin integrity remains intact  Description: 1.  Monitor for areas of redness and/or skin breakdown2.  Assess vascular access sites hourly3.  Every 4-6 hours minimum:  Change oxygen saturation probe site4.  Every 4-6 hours:  If on nasal continuous positive airway pressure, respiratory therapy assess nares and determine need for appliance change or resting period  Outcome: Progressing     Problem: Nutrition Deficit:  Goal: Optimize nutritional status  Outcome: Progressing     Problem: Pain  Goal: Verbalizes/displays adequate comfort level or baseline comfort level  Outcome: Progressing

## 2025-04-30 NOTE — PROGRESS NOTES
Occupational Therapy  OCCUPATIONAL THERAPY INITIAL EVALUATION  [facility]  [address], [place]    Date: 2025     Patient Name: Brenda Alamo  MRN: [id number]  : 1940  Room: [address] [place] [address]    Evaluating OT: Ching Cai, OTR/L - [id number]    Referring Provider: Dulce Wilder MD  Specific Provider Orders/Date: \"OT eval and treat\" - 2025 and 2025    Diagnosis: Splenic lesion [D73.89]  Left breast mass [N63.20]  Mass of lower outer quadrant of left breast [N63.23]  Alzheimer's dementia, unspecified dementia severity, unspecified timing of dementia onset, unspecified whether behavioral, psychotic, or mood disturbance or anxiety (HCC) [G30.9, F02.80]     Pertinent Medical History: Alzheimer's disease, anxiety, osteoporosis, HTN     Precautions: fall risk, TSM, bed/chair alarms    Assessment of Current Deficits:    [x] Functional mobility   [x] ADLs  [x] Strength               [x] Cognition   [x] Functional transfers   [x] IADLs         [x] Safety Awareness   [x] Endurance   [] Fine Motor Coordination  [x] Balance      [] Vision/Perception   [x] Sensation    [] Gross Motor Coordination [] ROM          [] Delirium                  [] Motor Control     OT PLAN OF CARE   OT POC is based on physician orders, patient diagnosis, and results of clinical assessment.  Frequency/Duration 2-5 days/week for 2-4 weeks PRN   Specific OT Treatment Interventions to Include:   * Instruction/training on adapted ADL techniques and AE recommendations to increase functional independence within precautions       * Training on energy conservation strategies, correct breathing pattern and techniques to improve independence/tolerance for self-care routine  * Functional transfer/mobility training/DME recommendations for increased independence, safety, and fall prevention  * Patient/Family education to increase follow through with safety techniques and

## 2025-05-01 VITALS
HEART RATE: 109 BPM | HEIGHT: 59 IN | OXYGEN SATURATION: 93 % | DIASTOLIC BLOOD PRESSURE: 57 MMHG | BODY MASS INDEX: 31.43 KG/M2 | SYSTOLIC BLOOD PRESSURE: 109 MMHG | TEMPERATURE: 98.1 F | WEIGHT: 155.9 LBS | RESPIRATION RATE: 18 BRPM

## 2025-05-01 LAB
EKG ATRIAL RATE: 124 BPM
EKG P AXIS: 45 DEGREES
EKG P-R INTERVAL: 138 MS
EKG Q-T INTERVAL: 332 MS
EKG QRS DURATION: 80 MS
EKG QTC CALCULATION (BAZETT): 476 MS
EKG R AXIS: 40 DEGREES
EKG T AXIS: 32 DEGREES
EKG VENTRICULAR RATE: 124 BPM

## 2025-05-01 PROCEDURE — 6370000000 HC RX 637 (ALT 250 FOR IP): Performed by: STUDENT IN AN ORGANIZED HEALTH CARE EDUCATION/TRAINING PROGRAM

## 2025-05-01 PROCEDURE — 93010 ELECTROCARDIOGRAM REPORT: CPT | Performed by: INTERNAL MEDICINE

## 2025-05-01 RX ADMIN — HALOPERIDOL 1 MG: 2 SOLUTION ORAL at 09:19

## 2025-05-01 RX ADMIN — SODIUM HYPOCHLORITE: 1.25 SOLUTION TOPICAL at 09:19

## 2025-05-01 NOTE — DISCHARGE SUMMARY
[facility]   Discharge summary   Patient ID:  Brenda Alamo  [id number]  85 y.o.  1940    Admit date: 4/27/2025    Discharge date and time: 5/1/2025    Admission Diagnoses:   Patient Active Problem List   Diagnosis    Personal history of breast cancer    Osteoporosis    Asymptomatic bilateral carotid artery stenosis    Essential hypertension    Cognitive dysfunction    Late onset Alzheimer's disease with behavioral disturbance (HCC)    Mixed hyperlipidemia    Type 2 diabetes mellitus without complication, without long-term current use of insulin (HCC)    Nuclear senile cataract    Left breast mass    Lactic acid acidosis    Metastatic disease (HCC)    Sinus tachycardia       Discharge Diagnoses: left breast mass. Dementia - stable.     Consults: oncology, palliative care, gen surgery    Procedures: left breast punch biopsy 4/28/25    Hospital Course: The patient is a 85 y.o. female of Alfredito Hidalgo MD with significant past medical history of osteoporosis, anxiety, dementia, right breast cancer s/p lumpectomy who presents with left breast wound with breast mass.  No signs of infection.   CT showing 7.5 x 4.5 x 5.5 cm lobulated left breast mass with areas of necrosis and enlarged nodes. Also concerns for mets to spleen.  Surgery consulted - biopsy done.  Patient is baseline confusion.  Labs stable.  Palliative consulted - due to poor prognosis if cancer due to functional status and dementia.  Hospice to speak with family after discharge.   Family asking for rehab - SNF placement found at [facility]. PT score 16/24.  Oncology to f/u outpatient. PET scan outpatient.   Of note, developed tachycardia 4/29 after walking - -130s. EKG showing sinus tachycardia. Cardiology consulted - no arrhythmia noted. No work-up.  Improved after 250 mL NS bolus. Might be candidate for beta blocker outpatient.  Started on haldol BID for agitation; symptoms improved.       Recent Labs     04/29/25  0447 04/30/25  0935    WBC 7.7 7.6   HGB 11.9 11.4*   HCT 39.1 36.1    245       Recent Labs     04/29/25  0447 04/30/25  0935    134   K 3.9 3.8    97*   CO2 18* 22   BUN 11 23   CREATININE 0.5 0.5   CALCIUM 9.1 9.1       CT CHEST W CONTRAST  Result Date: 4/27/2025  EXAMINATION: CT OF THE CHEST WITH CONTRAST 4/27/2025 7:28 pm TECHNIQUE: CT of the chest was performed with the administration of intravenous contrast. Multiplanar reformatted images are provided for review. Automated exposure control, iterative reconstruction, and/or weight based adjustment of the mA/kV was utilized to reduce the radiation dose to as low as reasonably achievable. COMPARISON: None. HISTORY: ORDERING SYSTEM PROVIDED HISTORY: Infiltrative mass to left breast TECHNOLOGIST PROVIDED HISTORY: Reason for exam:->Infiltrative mass to left breast Decision Support Exception - unselect if not a suspected or confirmed emergency medical condition->Emergency Medical Condition (MA) FINDINGS: Mediastinum: Thoracic aorta, heart and pericardium are normal.  No significant mediastinal adenopathy. Lungs/pleura: The lungs are clear.  No pleural effusions or pneumothorax. Upper Abdomen: 3.5 cm splenic cyst.  Multiple low-attenuation splenic lesions worrisome for metastatic disease.  Multiple liver cysts with the largest measuring up to 2.0 cm.  Hiatal hernia. Soft Tissues/Bones: 7.5 cm x 4.5 cm x 5.5 cm lobulated complex heterogeneous mass involving the left breast which extends to the adjacent cutaneous layer with extensive breast skin thickening and adjacent inflammatory stranding. Large masses/lymph nodes involving the left axilla with possible areas of necrosis and measuring up to 4.5 cm.  Thickening of the left pectoralis major and minor muscle bellies.  Findings highly worrisome for malignancy and targeted breast ultrasound as well as surgical consultation recommended. Degenerative changes thoracic spine with chronic appearing compression deformities.

## 2025-05-01 NOTE — PROGRESS NOTES
Report given to Verena at [facility].  Informed  is for 1000.  Papers faxed to [phone].

## 2025-05-01 NOTE — PLAN OF CARE
Problem: Safety - Adult  Goal: Free from fall injury  4/30/2025 2157 by Matthew Romano, RN  Outcome: Progressing     Problem: Chronic Conditions and Co-morbidities  Goal: Patient's chronic conditions and co-morbidity symptoms are monitored and maintained or improved  4/30/2025 2157 by Matthew Romano, RN  Outcome: Progressing     Problem: Discharge Planning  Goal: Discharge to home or other facility with appropriate resources  4/30/2025 2157 by Matthew Romano, RN  Outcome: Progressing

## 2025-05-02 ENCOUNTER — OUTSIDE SERVICES (OUTPATIENT)
Dept: PRIMARY CARE CLINIC | Age: 85
End: 2025-05-02
Payer: MEDICARE

## 2025-05-02 DIAGNOSIS — R53.1 GENERALIZED WEAKNESS: ICD-10-CM

## 2025-05-02 DIAGNOSIS — E11.9 TYPE 2 DIABETES MELLITUS WITHOUT COMPLICATION, WITHOUT LONG-TERM CURRENT USE OF INSULIN (HCC): ICD-10-CM

## 2025-05-02 DIAGNOSIS — R53.81 PHYSICAL DECONDITIONING: ICD-10-CM

## 2025-05-02 DIAGNOSIS — G30.1 LATE ONSET ALZHEIMER'S DISEASE WITH BEHAVIORAL DISTURBANCE (HCC): ICD-10-CM

## 2025-05-02 DIAGNOSIS — E78.2 MIXED HYPERLIPIDEMIA: ICD-10-CM

## 2025-05-02 DIAGNOSIS — N63.20 MASS OF LEFT BREAST, UNSPECIFIED QUADRANT: Primary | ICD-10-CM

## 2025-05-02 DIAGNOSIS — Z91.81 AT MAXIMUM RISK FOR FALL: ICD-10-CM

## 2025-05-02 DIAGNOSIS — F02.818 LATE ONSET ALZHEIMER'S DISEASE WITH BEHAVIORAL DISTURBANCE (HCC): ICD-10-CM

## 2025-05-02 DIAGNOSIS — I10 ESSENTIAL HYPERTENSION: ICD-10-CM

## 2025-05-02 DIAGNOSIS — M81.0 AGE-RELATED OSTEOPOROSIS WITHOUT CURRENT PATHOLOGICAL FRACTURE: ICD-10-CM

## 2025-05-02 LAB
MICROORGANISM SPEC CULT: NORMAL
MICROORGANISM SPEC CULT: NORMAL
SERVICE CMNT-IMP: NORMAL
SERVICE CMNT-IMP: NORMAL
SPECIMEN DESCRIPTION: NORMAL
SPECIMEN DESCRIPTION: NORMAL

## 2025-05-02 PROCEDURE — 99305 1ST NF CARE MODERATE MDM 35: CPT | Performed by: STUDENT IN AN ORGANIZED HEALTH CARE EDUCATION/TRAINING PROGRAM

## 2025-05-02 NOTE — PROGRESS NOTES
Brenda Alamo (:  1940) is a 85 y.o. female.    Subjective   SUBJECTIVE/OBJECTIVE:  Past Medical History:   Diagnosis Date    Alzheimer disease (HCC)     Anxiety as acute reaction to gross stress 2017    Asymptomatic bilateral carotid artery stenosis 2017    HTN, goal below 140/80 2017    STRESS ??    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    DILATION AND CURETTAGE OF UTERUS  3/20/2002    uterine fibroids x 2    FIXATION KYPHOPLASTY  2017    t7    SPINE SURGERY N/A 2021    T10 KYPHOPLASTY---C ARM--[employer]TR[employer] performed by Fernandez Anne MD at [facility] [place]      No family history on file.   Social History     Socioeconomic History    Marital status:    Tobacco Use    Smoking status: Never    Smokeless tobacco: Never   Vaping Use    Vaping status: Never Used   Substance and Sexual Activity    Alcohol use: No    Drug use: No    Sexual activity: Defer     Social Drivers of Health     Financial Resource Strain: Low Risk  (2024)    Overall Financial Resource Strain (CARDIA)     Difficulty of Paying Living Expenses: Not very hard   Food Insecurity: No Food Insecurity (2025)    Hunger Vital Sign     Worried About Running Out of Food in the Last Year: Never true     Ran Out of Food in the Last Year: Never true   Transportation Needs: No Transportation Needs (2025)    PRAPARE - Transportation     Lack of Transportation (Medical): No     Lack of Transportation (Non-Medical): No   Physical Activity: Insufficiently Active (2025)    Exercise Vital Sign     Days of Exercise per Week: 7 days     Minutes of Exercise per Session: 20 min   Housing Stability: Low Risk  (2025)    Housing Stability Vital Sign     TC:   Called in regards to lab results. LVBTC.   .

## 2025-05-05 ENCOUNTER — TELEPHONE (OUTPATIENT)
Dept: PALLATIVE CARE | Age: 85
End: 2025-05-05

## 2025-05-05 NOTE — TELEPHONE ENCOUNTER
Call back from Kleber regarding his mother's appointment. Kleber agrees to the scheduled appointment and states he can come either way. If his mother is home he can transport her to [facility]. Directions to clinic provided.

## 2025-05-05 NOTE — TELEPHONE ENCOUNTER
Called to Brenda's son Kleber regarding follow up appointment from hospital stay. Left VM and contact number for \"OK\" to see patient at clinic. [facility] will transport.

## 2025-05-05 NOTE — TELEPHONE ENCOUNTER
Placed call to [facility] to schedule pt for outpatient follow up. Spoke with Verena to schedule appt, Verena stated pt can be transported to appt but stated she may be at home by that time, advised her we will confirm prior to appt. Pt scheduled to be seen at [facility] on 5/19 at 9am. Provided directions.        Radha HAMMOND,[facility]  Palliative Medicine

## 2025-05-06 LAB — SURGICAL PATHOLOGY REPORT: NORMAL

## 2025-05-08 ENCOUNTER — TELEPHONE (OUTPATIENT)
Dept: BREAST CENTER | Age: 85
End: 2025-05-08

## 2025-05-08 NOTE — TELEPHONE ENCOUNTER
Patient was referred by  for left breast cancer. Patient was scheduled on 5/14/2025 in [facility] office @ 1030am with Dr. Morley. Patient son Luis Alberto verbalized understanding of appointment instructions.          Electronically signed by Lianne Beltran RN on 5/8/25 at 1:57 PM EDT

## 2025-05-12 ENCOUNTER — OUTSIDE SERVICES (OUTPATIENT)
Dept: PRIMARY CARE CLINIC | Age: 85
End: 2025-05-12
Payer: MEDICARE

## 2025-05-12 DIAGNOSIS — G30.1 LATE ONSET ALZHEIMER'S DISEASE WITH BEHAVIORAL DISTURBANCE (HCC): ICD-10-CM

## 2025-05-12 DIAGNOSIS — R53.1 GENERALIZED WEAKNESS: ICD-10-CM

## 2025-05-12 DIAGNOSIS — E55.9 VITAMIN D DEFICIENCY: ICD-10-CM

## 2025-05-12 DIAGNOSIS — R53.81 PHYSICAL DECONDITIONING: ICD-10-CM

## 2025-05-12 DIAGNOSIS — Z17.1 MALIGNANT NEOPLASM OF OVERLAPPING SITES OF LEFT BREAST IN FEMALE, ESTROGEN RECEPTOR NEGATIVE (HCC): ICD-10-CM

## 2025-05-12 DIAGNOSIS — R41.82 ALTERED MENTAL STATUS, UNSPECIFIED ALTERED MENTAL STATUS TYPE: ICD-10-CM

## 2025-05-12 DIAGNOSIS — E78.2 MIXED HYPERLIPIDEMIA: ICD-10-CM

## 2025-05-12 DIAGNOSIS — C50.812 MALIGNANT NEOPLASM OF OVERLAPPING SITES OF LEFT BREAST IN FEMALE, ESTROGEN RECEPTOR NEGATIVE (HCC): ICD-10-CM

## 2025-05-12 DIAGNOSIS — E11.9 TYPE 2 DIABETES MELLITUS WITHOUT COMPLICATION, WITHOUT LONG-TERM CURRENT USE OF INSULIN (HCC): ICD-10-CM

## 2025-05-12 DIAGNOSIS — I10 ESSENTIAL HYPERTENSION: ICD-10-CM

## 2025-05-12 DIAGNOSIS — E83.52 HYPERCALCEMIA: ICD-10-CM

## 2025-05-12 DIAGNOSIS — F02.818 LATE ONSET ALZHEIMER'S DISEASE WITH BEHAVIORAL DISTURBANCE (HCC): ICD-10-CM

## 2025-05-12 DIAGNOSIS — C79.9 METASTATIC MALIGNANT NEOPLASM, UNSPECIFIED SITE (HCC): ICD-10-CM

## 2025-05-12 DIAGNOSIS — N63.20 MASS OF LEFT BREAST, UNSPECIFIED QUADRANT: Primary | ICD-10-CM

## 2025-05-12 PROCEDURE — 99309 SBSQ NF CARE MODERATE MDM 30: CPT | Performed by: NURSE PRACTITIONER

## 2025-05-14 ENCOUNTER — OFFICE VISIT (OUTPATIENT)
Age: 85
End: 2025-05-14
Payer: MEDICARE

## 2025-05-14 VITALS
HEART RATE: 104 BPM | OXYGEN SATURATION: 94 % | RESPIRATION RATE: 14 BRPM | SYSTOLIC BLOOD PRESSURE: 130 MMHG | WEIGHT: 145 LBS | DIASTOLIC BLOOD PRESSURE: 64 MMHG | TEMPERATURE: 98.1 F | HEIGHT: 59 IN | BODY MASS INDEX: 29.23 KG/M2

## 2025-05-14 DIAGNOSIS — Z17.1 MALIGNANT NEOPLASM OF OVERLAPPING SITES OF LEFT BREAST IN FEMALE, ESTROGEN RECEPTOR NEGATIVE (HCC): Primary | ICD-10-CM

## 2025-05-14 DIAGNOSIS — C50.812 MALIGNANT NEOPLASM OF OVERLAPPING SITES OF LEFT BREAST IN FEMALE, ESTROGEN RECEPTOR NEGATIVE (HCC): Primary | ICD-10-CM

## 2025-05-14 PROCEDURE — G8399 PT W/DXA RESULTS DOCUMENT: HCPCS | Performed by: SURGERY

## 2025-05-14 PROCEDURE — 99203 OFFICE O/P NEW LOW 30 MIN: CPT | Performed by: SURGERY

## 2025-05-14 PROCEDURE — 1159F MED LIST DOCD IN RCRD: CPT | Performed by: SURGERY

## 2025-05-14 PROCEDURE — G8427 DOCREV CUR MEDS BY ELIG CLIN: HCPCS | Performed by: SURGERY

## 2025-05-14 PROCEDURE — 1036F TOBACCO NON-USER: CPT | Performed by: SURGERY

## 2025-05-14 PROCEDURE — 1123F ACP DISCUSS/DSCN MKR DOCD: CPT | Performed by: SURGERY

## 2025-05-14 PROCEDURE — 1111F DSCHRG MED/CURRENT MED MERGE: CPT | Performed by: SURGERY

## 2025-05-14 PROCEDURE — 3078F DIAST BP <80 MM HG: CPT | Performed by: SURGERY

## 2025-05-14 PROCEDURE — 1090F PRES/ABSN URINE INCON ASSESS: CPT | Performed by: SURGERY

## 2025-05-14 PROCEDURE — 3075F SYST BP GE 130 - 139MM HG: CPT | Performed by: SURGERY

## 2025-05-14 PROCEDURE — G8417 CALC BMI ABV UP PARAM F/U: HCPCS | Performed by: SURGERY

## 2025-05-14 NOTE — PROGRESS NOTES
G8 QUESTIONNAIRE    ITEMS POSSIBLE ANSWERS SCORE         A Has food intake declined over the past 3 months due to loss of appetite, digestive problems, chewing or swallowing difficulties? 0 : severe decrease in food intake  1 : moderate decrease in food intake  2:  no decrease in food intake  2        B                                                                                Weight loss during the last 3 months 0 : weight loss > 3 kg  1 : does not know  2 : weight loss between 1 & 3 kgs  3 : no weight loss  3       C                                                                                                                            Mobility 0 : bed or chair bound  1 : able to get out of bed/chair but does not go out  2 : goes out 1        E                                                                           Neuropsychological Problems 0 : severe dementia or depression  1 : mild dementia or depression  2 : no psychological problems 0        F                                                                                               Body Mass Index (BMI (weight in kg/height in m2)            Body mass index is 29.29 kg/m². 0 : BMI < 19  1 : BMI = 19 to BMI < 21  2 : BMI = 21 to BMI < 23  3 : BMI = 23 and > 23 3       H                                                                                                    Takes more than 3 medications per day 0 : yes  1 : no 1        P                                                                                            In comparison with other people of the same age, how does the patient consider his/her health status? 0 : not as good  0.5 : does not know  1 : as good  2 : better 0.5                                                                                               Age 0 : > 85  1 : 80 - 85  2 : < 80 1                                                                                                            TOTAL SCORE ( 0- 17)

## 2025-05-14 NOTE — PROGRESS NOTES
Date of Visit: 5/14/2025  New Patient Invasive Breast Cancer    05/14/25      DIAGNOSIS:  1. (05/14/25) LEFT breast cancer  Clinical stage: T4dN1(scan)M1(spleen)  ER (-) TX (-) HER2 (+)  * ED 04/27 for blood on shirt  * Punch + for dermal lymphatics  * Med onc Rangel  2. (2000) RIGHT breast cancer  Clinical stage: N5aP2M3  ER (+) TX (+) HER2 (-)  3. Dementia - sees Tamayo    TREATMENT  1. (04/28/25) Med onc Rangel  2. (05/12/00) RIGHT lumpectomy and ALND ([facility])  3. Adjuvant RT  4. Adjuvant ACEVEDO > AI    IMAGING/PROCEDURES:  1. (09/26/19) BILATERAL st-mammogram (SW) BIRADS-2  2. (04/28/25) CT  * LEFT breast mass involving skin and pec  * LEFT axillary adenopathy  * Splenic lesions likely mets  * Possible liver lesions  3. (04/28/25) LEFT breast punch biopsy      HISTORY OF PRESENT ILLNESS  Brenda Alamo was in the office for consultation regarding a diagnosis of a locally advanced ER negative HER2 positive left breast cancer.    Brenda is experiencing dementia.  She is accompanied today by her son who provides the entire history.  The patient reports that just about a month ago they noticed some blood on her clothing.  She was evaluated in the emergency room and eventually admitted for evaluation.  She had a very enlarged left breast with skin ulceration.  She underwent a punch biopsy that demonstrated the presence of invasive carcinoma.  The malignancy is noted to be ER negative and HER2 positive.  She was evaluated by medical oncology during that admission.  The patient also had a CT scan which showed a large left breast mass, diffuse parenchymal abnormalities and axillary adenopathy.  Also of note it is what is likely splenic metastases and possible liver metastases.    BREAST SYMPTOMS  Brenda tells me that the irregularity is not painful.  Per her son she has not had no excessive bleeding.  The open areas occasionally are malodorous.    PAST BREAST HISTORY  Brenda underwent treatment of her right breast cancer in 2000.  At

## 2025-05-15 ENCOUNTER — TELEPHONE (OUTPATIENT)
Dept: CARDIOLOGY CLINIC | Age: 85
End: 2025-05-15

## 2025-05-15 ENCOUNTER — TELEPHONE (OUTPATIENT)
Dept: CASE MANAGEMENT | Age: 85
End: 2025-05-15

## 2025-05-15 NOTE — TELEPHONE ENCOUNTER
Late entry from 5-14-25: Missed opportunity to see patient. Nurse Navigator prepared education packet given by breast clinic staff along with \"Be A Survivor: Your guide to Breast Cancer Treatment\" book. I also provided patient with Patient Resource booklet on Her2+ Breast Cancer.

## 2025-05-19 ENCOUNTER — OFFICE VISIT (OUTPATIENT)
Age: 85
End: 2025-05-19
Payer: MEDICARE

## 2025-05-19 VITALS
BODY MASS INDEX: 26.88 KG/M2 | OXYGEN SATURATION: 95 % | DIASTOLIC BLOOD PRESSURE: 56 MMHG | TEMPERATURE: 98.2 F | WEIGHT: 133.1 LBS | SYSTOLIC BLOOD PRESSURE: 109 MMHG | HEART RATE: 100 BPM

## 2025-05-19 DIAGNOSIS — G89.3 PAIN DUE TO NEOPLASM: Primary | ICD-10-CM

## 2025-05-19 DIAGNOSIS — N63.20 MASS OF LEFT BREAST, UNSPECIFIED QUADRANT: ICD-10-CM

## 2025-05-19 DIAGNOSIS — Z71.89 GOALS OF CARE, COUNSELING/DISCUSSION: ICD-10-CM

## 2025-05-19 DIAGNOSIS — Z51.5 PALLIATIVE CARE BY SPECIALIST: ICD-10-CM

## 2025-05-19 PROCEDURE — 99215 OFFICE O/P EST HI 40 MIN: CPT | Performed by: NURSE PRACTITIONER

## 2025-05-19 PROCEDURE — G8399 PT W/DXA RESULTS DOCUMENT: HCPCS | Performed by: NURSE PRACTITIONER

## 2025-05-19 PROCEDURE — 1090F PRES/ABSN URINE INCON ASSESS: CPT | Performed by: NURSE PRACTITIONER

## 2025-05-19 PROCEDURE — G8417 CALC BMI ABV UP PARAM F/U: HCPCS | Performed by: NURSE PRACTITIONER

## 2025-05-19 PROCEDURE — 1036F TOBACCO NON-USER: CPT | Performed by: NURSE PRACTITIONER

## 2025-05-19 PROCEDURE — 1111F DSCHRG MED/CURRENT MED MERGE: CPT | Performed by: NURSE PRACTITIONER

## 2025-05-19 PROCEDURE — 3074F SYST BP LT 130 MM HG: CPT | Performed by: NURSE PRACTITIONER

## 2025-05-19 PROCEDURE — G8428 CUR MEDS NOT DOCUMENT: HCPCS | Performed by: NURSE PRACTITIONER

## 2025-05-19 PROCEDURE — 99497 ADVNCD CARE PLAN 30 MIN: CPT | Performed by: NURSE PRACTITIONER

## 2025-05-19 PROCEDURE — 1123F ACP DISCUSS/DSCN MKR DOCD: CPT | Performed by: NURSE PRACTITIONER

## 2025-05-19 PROCEDURE — 3078F DIAST BP <80 MM HG: CPT | Performed by: NURSE PRACTITIONER

## 2025-05-19 RX ORDER — TRAMADOL HYDROCHLORIDE 50 MG/1
50 TABLET ORAL EVERY 6 HOURS PRN
Qty: 28 TABLET | Refills: 0 | Status: SHIPPED | OUTPATIENT
Start: 2025-05-19 | End: 2025-05-26

## 2025-05-19 NOTE — PROGRESS NOTES
Palliative Care Department  Provider: BOAZ Medina CNP    Location of Service:   [facility]    Chief Complaint: Brenda Alamo is a 85 y.o. female with chief complaint of restlessness    Assessment/Plan      Metastatic breast cancer:  Known to blood and cancer Center  Overall performance status is poor with additional history of dementia  Has met with surgery, but not a surgical candidate  Referred for possible palliative radiation therapy  Recommended palliative systemic therapy of Herceptin and Perjeta  For consultation with radiation therapy 5/29    Pain related neoplasm:  Tramadol    Restlessness:  Continue Haldol      Follow Up:  4 weeks.  They were encouraged to call with any questions, concerns, needs, or changes in symptoms.    Subjective:     Subjective/Events/Discussions:  History of Present Illness  The patient presents today for a follow-up visit after being seen by palliative medicine as an inpatient. She was recently diagnosed with breast cancer and is not thought to be a very good candidate for treatment due to dementia, advanced age, and performance status. She is accompanied by her son and daughter.    She has been under the care of Dr. Rangel at the [facility], who proposed palliative chemoradiation as a potential treatment option. However, her family has expressed concerns about the wound associated with the cancer and its potential for infection. They are also contemplating a full body scan to assess the extent of the disease but are uncertain due to her inability to sit for extended periods. The family is considering palliative care and has requested assistance with DNR paperwork. They have also inquired about the possibility of using a sling or Ace bandage to alleviate her discomfort. Her daughter has expressed interest in genetic testing for herself and her children, given her mother's history of breast cancer 25 years ago, which

## 2025-05-19 NOTE — PROGRESS NOTES
Advance Care Planning      Palliative Medicine Provider (MD/NP)  Advance Care Planning (ACP) Conversation      Date of Conversation: 05/19/25  The patient and/or authorized decision maker consented to a voluntary Advance Care Planning conversation.   Individuals present for the conversation:   Patient, Son Constantin, and Daughter Barbie    Legal Healthcare Agent(s):    Primary Decision Maker: Kleber Alamo - Child - [phone]    Secondary Decision Maker: Marcelo Alamo - Child - [phone]    ACP documents available in EMR prior to discussion:  -Power of  for Healthcare    Primary Palliative Diagnosis(es):  Metastatic left breast cancer    Conversation Summary:  A conversation was held today with Brenda present, however due to her dementia she has poor decisional capacity, and does not actively participate in goals of care conversation.  Conversation was primarily held with the patient's children Constantin and Barbie.  We had extensive discussion regarding goals of care including treatment options.  They have met with oncology and discussed palliative treatment options including chemotherapy, however they do not believe chemotherapy would be in her best interest.  We have additionally discussed the potential role for palliative radiation, and they would consider this if it were to provide a quality of life benefit.  The patient does not complain of pain in her left breast either related to mass or her large wound.  They were encouraged to pursue consultation with radiation to discuss potential treatment benefits prior to making decision for or against radiation therapy at this time.  We additionally had extensive discussions regarding the role of hospice as well as prognosis.  They are interested in potentially pursuing hospice, but would like to have discussion with radiation prior to making this decision.  She is currently residing in a skilled nursing facility, the family is contemplating having her come

## 2025-05-27 ENCOUNTER — TELEPHONE (OUTPATIENT)
Dept: GERIATRIC MEDICINE | Age: 85
End: 2025-05-27

## 2025-05-27 NOTE — TELEPHONE ENCOUNTER
Late Entry-   Patients daughter Rosalva called on 5/23. She stated the patient is staying at [facility]. Rosalva and her brothers have concerns the patient is not sleeping at night and that her memory is getting worse. The patient had a psych eval done the morning of 5/23 to possibly wean the patient off of haldol and the family also has concerns over reducing the haldol stating it seems to be helping her anxiety.   I called the facility and spoke with nurse Kathrine on 5/23.Kathrine stated she would speak with the family regarding concerns. She was unaware of any changes to haldol.     Patients family is requesting a call from Dr Tamayo to discuss their concerns for the patient Please advise

## 2025-05-28 ENCOUNTER — TELEPHONE (OUTPATIENT)
Dept: RADIATION ONCOLOGY | Age: 85
End: 2025-05-28

## 2025-05-28 ENCOUNTER — TELEPHONE (OUTPATIENT)
Dept: GERIATRIC MEDICINE | Age: 85
End: 2025-05-28

## 2025-05-28 NOTE — TELEPHONE ENCOUNTER
Spoke with son, Luis Alberto. He states patient will not have PET scan, but the family requests to still be seen by Dr. Blanchard in RT consult on 6-2.

## 2025-05-28 NOTE — TELEPHONE ENCOUNTER
Patients daughter Rosalva called and stated the Dr at [facility] prescribed Ativan for the patient along with the Haldol and Tramadol. Rosalva is asking if there is something else that would help with anxiety instead of Ativan? Please advise

## 2025-06-02 ENCOUNTER — HOSPITAL ENCOUNTER (OUTPATIENT)
Dept: RADIATION ONCOLOGY | Age: 85
Discharge: HOME | End: 2025-06-02
Payer: MEDICARE

## 2025-06-02 VITALS
RESPIRATION RATE: 18 BRPM | SYSTOLIC BLOOD PRESSURE: 122 MMHG | BODY MASS INDEX: 27.19 KG/M2 | DIASTOLIC BLOOD PRESSURE: 65 MMHG | WEIGHT: 134.6 LBS | TEMPERATURE: 98.3 F | HEART RATE: 104 BPM

## 2025-06-02 DIAGNOSIS — C50.812 MALIGNANT NEOPLASM OF OVERLAPPING SITES OF LEFT BREAST IN FEMALE, ESTROGEN RECEPTOR NEGATIVE (HCC): Primary | ICD-10-CM

## 2025-06-02 DIAGNOSIS — Z17.1 MALIGNANT NEOPLASM OF OVERLAPPING SITES OF LEFT BREAST IN FEMALE, ESTROGEN RECEPTOR NEGATIVE (HCC): Primary | ICD-10-CM

## 2025-06-02 DIAGNOSIS — R39.9 URINARY TRACT INFECTION SYMPTOMS: Primary | ICD-10-CM

## 2025-06-02 PROCEDURE — 99205 OFFICE O/P NEW HI 60 MIN: CPT

## 2025-06-02 PROCEDURE — 99205 OFFICE O/P NEW HI 60 MIN: CPT | Performed by: RADIOLOGY

## 2025-06-02 NOTE — PROGRESS NOTES
Brenda Alamo  1940 85 y.o.      Referring Physician:  Dr Rangel    PCP: Alfredito Hidalgo MD     There were no vitals filed for this visit.     Wt Readings from Last 3 Encounters:   25 60.4 kg (133 lb 1.6 oz)   25 65.8 kg (145 lb)   25 70.7 kg (155 lb 14.4 oz)        There is no height or weight on file to calculate BMI.               Chief Complaint: No chief complaint on file.         Cancer Staging   No matching staging information was found for the patient.      Prior Radiation Therapy? YES: Site Treated: right breast (per daughter)          Facility: [facility]          Date:     Concurrent Chemo/radiation? NO    Prior Chemotherapy? NO    Prior Hormonal Therapy? YES: Site Treated: Right  breast           Facility: [facility] -          Date: / Tamoxifen then switch to Arimidex    Head and Neck Cancer? No, patient does NOT have HN cancer.      LMP: 50    Age at first Menses: 13    : 6 (information received from her daughter Barbie)    Para: 3        Current Outpatient Medications   Medication Sig Dispense Refill    sodium hypochlorite (DAKINS) 0.125 % SOLN external solution Apply 1 each topically daily. May also apply 1 each as needed (if dressing is saturated or soiled). 30 each 3    melatonin 3 MG TABS tablet Take 1 tablet by mouth nightly      haloperidol (HALDOL) 2 MG/ML oral solution Take 0.5 mLs by mouth 2 times daily 30 mL 3    denosumab (PROLIA) 60 MG/ML SOSY SC injection Inject 1 mL into the skin every 6 months 1 mL 0     No current facility-administered medications for this encounter.       Past Medical History:   Diagnosis Date    Alzheimer disease (HCC)     Anxiety as acute reaction to gross stress 2017    Asymptomatic bilateral carotid artery stenosis 2017    Breast cancer, left (HCC)     Breast cancer, right (HCC)     lumpectomy, No chemo and radiation    HTN, goal below 140/80 2017    STRESS ??    Hyperlipidemia     Lipids blood increased 2017    
with HER2 antibodies, but for the time being the family is opposed to the treatment due to her comorbidities.    DISCUSSION/PLAN:       I have discussed with the family a palliative course of radiation therapy to the right breast, the test the intent of shrinking down the cancer, hopefully obtain a very good partial response versus a complete response, that we will get her better quality of life.  I have discussed with the family the possible side effects, predominantly related to scar tissue formation, telangiectasias and increased density of the breast.  At the end of the discussion the patient and her family voiced understanding and were agreeable with the plan.  I am planning for 40 Gray in 10 fraction with the possibility of using IMRT to spare her heart..  I believe that the patient would not be a good candidate for a DIBH technique.    NCCN guidelines, version 2020 is used to help review treatment recommendations.        Procedures and process involved with CT simulation and daily radiation treatments were explained.  Toxicities, both expected and less common, were reviewed in detail.  Questions were answered to their apparent satisfaction.     I have spent 65 minutes reviewing the case, examining the patient and discussing the treatment with her and her family.    Thank you for the opportunity to participate in multidisciplinary management of this remarkable and pleasant patient.      Rylee Blanchard MD    Department of Radiation Oncology  [facility]: [phone] (FAX: [phone])  [facility]: [phone] (FAX: [phone])  [facility]:  [phone] (FAX:  [phone])

## 2025-06-05 PROBLEM — Z51.5 ENCOUNTER FOR PALLIATIVE CARE: Status: ACTIVE | Noted: 2025-06-05

## 2025-06-06 ENCOUNTER — HOSPITAL ENCOUNTER (OUTPATIENT)
Dept: RADIATION ONCOLOGY | Age: 85
Discharge: HOME | End: 2025-06-06
Payer: MEDICARE

## 2025-06-06 ENCOUNTER — OFFICE VISIT (OUTPATIENT)
Dept: GERIATRIC MEDICINE | Age: 85
End: 2025-06-06
Payer: MEDICARE

## 2025-06-06 VITALS — HEART RATE: 103 BPM | OXYGEN SATURATION: 96 % | DIASTOLIC BLOOD PRESSURE: 58 MMHG | SYSTOLIC BLOOD PRESSURE: 110 MMHG

## 2025-06-06 DIAGNOSIS — F02.80 ALZHEIMER DISEASE (HCC): Primary | ICD-10-CM

## 2025-06-06 DIAGNOSIS — G30.9 ALZHEIMER DISEASE (HCC): Primary | ICD-10-CM

## 2025-06-06 PROCEDURE — 77290 THER RAD SIMULAJ FIELD CPLX: CPT | Performed by: RADIOLOGY

## 2025-06-06 PROCEDURE — 77334 RADIATION TREATMENT AID(S): CPT | Performed by: RADIOLOGY

## 2025-06-06 PROCEDURE — 1123F ACP DISCUSS/DSCN MKR DOCD: CPT | Performed by: INTERNAL MEDICINE

## 2025-06-06 PROCEDURE — 3074F SYST BP LT 130 MM HG: CPT | Performed by: INTERNAL MEDICINE

## 2025-06-06 PROCEDURE — 3078F DIAST BP <80 MM HG: CPT | Performed by: INTERNAL MEDICINE

## 2025-06-06 PROCEDURE — 99213 OFFICE O/P EST LOW 20 MIN: CPT | Performed by: INTERNAL MEDICINE

## 2025-06-06 RX ORDER — LORAZEPAM 0.5 MG/1
0.5 TABLET ORAL EVERY 6 HOURS PRN
COMMUNITY

## 2025-06-06 RX ORDER — METRONIDAZOLE 10 MG/G
GEL TOPICAL
COMMUNITY
Start: 2025-06-03

## 2025-06-06 RX ORDER — DOXYCYCLINE HYCLATE 100 MG
TABLET ORAL
COMMUNITY
Start: 2025-06-03

## 2025-06-06 RX ORDER — TRAMADOL HYDROCHLORIDE 50 MG/1
TABLET ORAL
COMMUNITY
Start: 2025-05-28

## 2025-06-06 RX ORDER — MENTHOL 40 MG/ML
GEL TOPICAL
COMMUNITY

## 2025-06-06 RX ORDER — OXCARBAZEPINE 150 MG/1
TABLET, FILM COATED ORAL
COMMUNITY
Start: 2025-05-23

## 2025-06-06 NOTE — PROGRESS NOTES
NOW AT [facility]    Adapting welll  Breast  CA large and leaking on left  Sleeping in WC and easily aroused  Sundowning activity without Haldol  No rigidity  Falling from Trileptil presently  H&L clear  No UTI  No tremor   Minicog low to 0  Impression; Polypharmacy and Alzheimer's behavior issues at facility                      Memory care resident                      Happy wanderer/unhappy blanquita syndrome                      Wheelchair bound                      Haldol Parkinsonism  Plan;  Trial with citalopram 10 mg a day             Trazedone 25 mg hs             Slowly wean Haldol after above and follow             Consider Bradleynda later

## 2025-06-10 ENCOUNTER — TELEPHONE (OUTPATIENT)
Dept: GERIATRIC MEDICINE | Age: 85
End: 2025-06-10

## 2025-06-10 NOTE — TELEPHONE ENCOUNTER
Patients daughter Jennifer called. She has some questions that she would like to ask Dr Tamayo.    1- Is her mom taking to many antibiotics? An could they have an effect on her bodily functions?  2- When should the hallidol be completely out of her system?   And a few more, please call her back at [phone]      Please advise,  -Samira

## 2025-06-10 NOTE — TELEPHONE ENCOUNTER
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 Will need to talk to attending at facility tomorrow

## 2025-06-11 ENCOUNTER — TELEPHONE (OUTPATIENT)
Age: 85
End: 2025-06-11

## 2025-06-11 NOTE — TELEPHONE ENCOUNTER
Received call from pt daughter Jennifer regarding pt current status. Jennifer was asking if we were able to see pt at facility, advised her we can't due to not having contract with [facility]. She stated pt is doing okay, more wheelchair bound at this time and seems to be more tired with increased weakness. Jennifer inquired about how to get physical therapy for pt,advised her to speak with  or nurse at facility to see if pt can qualify for some continued rehab. She would like to reschedule pt appt from 6/16 to later in August due to no concerning issues from a palliative care standpoint. Informed her this appt could be changed at any time or if pt needed to be seen in home. Family is hesitant to bring her home at this time due to full time care but will contact us if needed. Pt will be receiving Palliative radiation starting next week and are focused on keeping pt comfortable and are open to hospice conversation in future if needed. Advised Barbie to call back for any further needs.      Radha Atkinson MSW,LSW  Palliative Medicine

## 2025-06-12 ENCOUNTER — OUTSIDE SERVICES (OUTPATIENT)
Dept: PRIMARY CARE CLINIC | Age: 85
End: 2025-06-12
Payer: MEDICARE

## 2025-06-12 DIAGNOSIS — F02.818 LATE ONSET ALZHEIMER'S DISEASE WITH BEHAVIORAL DISTURBANCE (HCC): ICD-10-CM

## 2025-06-12 DIAGNOSIS — E11.9 TYPE 2 DIABETES MELLITUS WITHOUT COMPLICATION, WITHOUT LONG-TERM CURRENT USE OF INSULIN (HCC): Primary | ICD-10-CM

## 2025-06-12 DIAGNOSIS — Z17.1 MALIGNANT NEOPLASM OF OVERLAPPING SITES OF LEFT BREAST IN FEMALE, ESTROGEN RECEPTOR NEGATIVE (HCC): ICD-10-CM

## 2025-06-12 DIAGNOSIS — I10 ESSENTIAL HYPERTENSION: ICD-10-CM

## 2025-06-12 DIAGNOSIS — E78.2 MIXED HYPERLIPIDEMIA: ICD-10-CM

## 2025-06-12 DIAGNOSIS — C50.812 MALIGNANT NEOPLASM OF OVERLAPPING SITES OF LEFT BREAST IN FEMALE, ESTROGEN RECEPTOR NEGATIVE (HCC): ICD-10-CM

## 2025-06-12 DIAGNOSIS — R53.81 PHYSICAL DECONDITIONING: ICD-10-CM

## 2025-06-12 DIAGNOSIS — G47.00 INSOMNIA, UNSPECIFIED TYPE: ICD-10-CM

## 2025-06-12 DIAGNOSIS — R53.1 GENERALIZED WEAKNESS: ICD-10-CM

## 2025-06-12 DIAGNOSIS — G30.1 LATE ONSET ALZHEIMER'S DISEASE WITH BEHAVIORAL DISTURBANCE (HCC): ICD-10-CM

## 2025-06-12 PROCEDURE — 99309 SBSQ NF CARE MODERATE MDM 30: CPT

## 2025-06-12 ASSESSMENT — ENCOUNTER SYMPTOMS
COUGH: 0
CONSTIPATION: 0
WHEEZING: 0
SORE THROAT: 0
PHOTOPHOBIA: 0
VOMITING: 0
ABDOMINAL PAIN: 0
RHINORRHEA: 0
SHORTNESS OF BREATH: 0
DIARRHEA: 0
BACK PAIN: 0

## 2025-06-12 NOTE — PROGRESS NOTES
Insomnia, unspecified type           Seen today for routine assessment  Chart reviewed: Continue with orders per chart in point click care  Labs: Collect per facility guidelines   Following w Dr. Tamayo. Adjust medication s  CA: undergoing palliative course of radiation   Insomnia: continue melatonin   Acute concerns to provider on call    Acute medical concerns provider on-call     Over 30 min was spent between patient care, chart review, discussing patient management with nursing staff and interpreting diagnostics      An electronic signature was used to authenticate this note.    --Nghia Foster PA-C   This office note has been dictated.

## 2025-06-13 ENCOUNTER — TELEPHONE (OUTPATIENT)
Dept: GERIATRIC MEDICINE | Age: 85
End: 2025-06-13

## 2025-06-13 NOTE — TELEPHONE ENCOUNTER
Rajiv Doherty Called wanted to notify Dr. Tamayo that the nurse practitioner started Alba on Remeron 7.5 qhs.        Please advise,  -Samira

## 2025-06-15 ENCOUNTER — HOSPITAL ENCOUNTER (OUTPATIENT)
Dept: RADIATION ONCOLOGY | Age: 85
Discharge: HOME | End: 2025-06-15
Payer: MEDICARE

## 2025-06-15 PROCEDURE — 77307 TELETHX ISODOSE PLAN CPLX: CPT | Performed by: RADIOLOGY

## 2025-06-15 PROCEDURE — 77334 RADIATION TREATMENT AID(S): CPT | Performed by: RADIOLOGY

## 2025-06-16 ENCOUNTER — TELEPHONE (OUTPATIENT)
Dept: RADIATION ONCOLOGY | Age: 85
End: 2025-06-16

## 2025-06-16 ENCOUNTER — TELEPHONE (OUTPATIENT)
Dept: GERIATRIC MEDICINE | Age: 85
End: 2025-06-16

## 2025-06-16 ENCOUNTER — TELEPHONE (OUTPATIENT)
Dept: CASE MANAGEMENT | Age: 85
End: 2025-06-16

## 2025-06-16 ENCOUNTER — TELEPHONE (OUTPATIENT)
Age: 85
End: 2025-06-16

## 2025-06-16 RX ORDER — ACETAMINOPHEN 650 MG/1
650 SUPPOSITORY RECTAL EVERY 4 HOURS PRN
COMMUNITY

## 2025-06-16 RX ORDER — METRONIDAZOLE 250 MG/1
250 TABLET ORAL 3 TIMES DAILY
COMMUNITY

## 2025-06-16 RX ORDER — TRAZODONE HYDROCHLORIDE 50 MG/1
50 TABLET ORAL NIGHTLY
COMMUNITY

## 2025-06-16 RX ORDER — POLYETHYLENE GLYCOL 3350 17 G/17G
17 POWDER, FOR SOLUTION ORAL DAILY
COMMUNITY

## 2025-06-16 RX ORDER — MIRTAZAPINE 7.5 MG/1
7.5 TABLET, FILM COATED ORAL NIGHTLY
COMMUNITY

## 2025-06-16 RX ORDER — CITALOPRAM HYDROBROMIDE 10 MG/1
10 TABLET ORAL DAILY
COMMUNITY

## 2025-06-16 NOTE — TELEPHONE ENCOUNTER
Received call from patient's daughter Rosalva in regards to patient's plan of care. Patient was simmed on 6/6 and daughter inquired about patient's start date. I let her know that it looks like patient's plan is signed and ready for scheduling from what I can see. I let her know she would more than likely get a call early this week for scheduling. She also states that patient is on antibiotics and is not eating well. She states she has our dietitians number and plans to call her to discuss further. I let her know I would update her prior. Patient is currently in long term care at [facility]. Patient's daughter appreciative of assistance.

## 2025-06-16 NOTE — TELEPHONE ENCOUNTER
Sammi left a VM re:  just to let us know that Brenda is having stomach issues with antibiotics, (patient lives in a facility), they already spoke to Dietician re: this.  She also mentioned that patient's urine is a little dark but she doesn't have UTI I reminded her to have patient drink more water, Sammi mentioned patient is not a drinker to begin with, Sammi voiced understanding.

## 2025-06-16 NOTE — TELEPHONE ENCOUNTER
Patients son Kleber called and stated the patient is having a lot of pain. She is taking tylenol and tramadol for pain. In an encounter from 6/13 it states the patient was going to start mirtazapine but Kleber said that the family declined to start the medication. Also stated the patient will be starting palliative radiation this Thursday for 10 days.  Please advise on any suggestions for the patient.     I reached out to get an updated medication list from the facility and spoke with nurse Mery. She faxed over a current medication list. Medications are updated in the patients chart. Mery also stated the patient is in a lot of pain as well.

## 2025-06-16 NOTE — TELEPHONE ENCOUNTER
Received call from patient's daughter, Barbie. Patient will be starting palliative RT soon to the breast. Patient has Alzheimer's and is currently living at [facility]. Daughter inquired about probiotic sources as patient is on antibiotics for wounds. Reviewed food sources of probiotics that patient may include into diet, like yogurt. Daughter is interested in having patient take a probiotic gummy, as well. Barbie has noticed a slight decrease in patient's appetite since April and believes the patient has lost about 10 pounds since that time. She admits that some of this weight loss was likely d/t the patient sleeping a lot during the day because she was on an anti-psychotic drug. Daughter says patient is not sleeping as much during the day now. States that she typically eats a good breakfast and drinks Ensure at the nursing facility. The family also takes her out to eat often. Appears that patient was started on Remeron on 6/12. Hopeful that this will help to improve appetite. Reviewed the goal of weight maintenance and suggested family bring patient nutrient dense snacks to have at the facility in addition to her meals to aid in PO intake. Encouraged continued use of ONS at the facility, as well. Daughter expressed appreciation and denied further needs at this time. Daughter is aware that this clinician is available to provide additional dietary assistance as patient begins treatment.     Electronically signed by Tea Tripathi RD, LD, MPH on 6/16/2025 at 10:15 AM

## 2025-06-17 NOTE — TELEPHONE ENCOUNTER
Sleeping from polypharmacy    Will stop melatonin, stop mirtazepine, Continue Tramadol 50 mg but dosage back to every 6 hours , she is too sleepy.             SHE WILL SLEEP ALL DAY because of Day , night reversal and not disruptive at night.   Paces and happy wandering at night

## 2025-06-17 NOTE — TELEPHONE ENCOUNTER
Called [facility] and spoke with Lolis. Faxed over above encounter. Lolis stated she would call the NP for the facility for her to sign the order.

## 2025-06-17 NOTE — TELEPHONE ENCOUNTER
Patient son called re: above. He asked why the patient is sleeping so much and if any of her medications could be causing this. Please advise

## 2025-06-19 ENCOUNTER — HOSPITAL ENCOUNTER (OUTPATIENT)
Dept: RADIATION ONCOLOGY | Age: 85
Discharge: HOME | End: 2025-06-19
Payer: MEDICARE

## 2025-06-19 PROCEDURE — 77280 THER RAD SIMULAJ FIELD SMPL: CPT | Performed by: RADIOLOGY

## 2025-06-19 NOTE — PROGRESS NOTES
Brenda Alamo (:  1940) is a 85 y.o. female that is seen today for skilled assessment    Location: [facility] skilled nursing facility  Progress and nursing notes reviewed    Brenda is awake alert and confused. She is sitting up at side of bed in room and in no obvious distress. She has no complaints of pain or discomfort when asked. She continues to work in therapies as tolerated. She does have wound to left breast and is followed by wound NP. She will follow up with [facility]    Subjective   SUBJECTIVE/OBJECTIVE:  Past Medical History:   Diagnosis Date    Alzheimer disease (HCC)     Anxiety as acute reaction to gross stress 2017    Asymptomatic bilateral carotid artery stenosis 2017    Breast cancer, left (HCC)     Breast cancer, right (HCC)     lumpectomy, No chemo and radiation    HTN, goal below 140/80 2017    STRESS ??    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    DILATION AND CURETTAGE OF UTERUS  3/20/2002    uterine fibroids x 2    FIXATION KYPHOPLASTY  2017    t7    SPINE SURGERY N/A 2021    T10 KYPHOPLASTY---C ARM--[employer] performed by Fernandez Anne MD at [facility]      Family History   Problem Relation Age of Onset    Cancer Son 47        testicular cancer      Social History     Socioeconomic History    Marital status:    Tobacco Use    Smoking status: Never    Smokeless tobacco: Never   Vaping Use    Vaping status: Never Used   Substance and Sexual Activity    Alcohol use: No    Drug use: No    Sexual activity: Defer     Social Drivers of Health     Financial Resource Strain: Low Risk  (2024)    Overall Financial Resource Strain (CARDIA)     Difficulty of Paying Living

## 2025-06-19 NOTE — TELEPHONE ENCOUNTER
Called [facility] and spoke with Lolis. She stated the fax was received and medications changes were started.

## 2025-06-20 ENCOUNTER — HOSPITAL ENCOUNTER (OUTPATIENT)
Dept: RADIATION ONCOLOGY | Age: 85
Discharge: HOME | End: 2025-06-20
Payer: MEDICARE

## 2025-06-20 PROCEDURE — 77412 RADIATION TX DELIVERY LVL 3: CPT | Performed by: RADIOLOGY

## 2025-06-23 ENCOUNTER — HOSPITAL ENCOUNTER (OUTPATIENT)
Dept: RADIATION ONCOLOGY | Age: 85
Discharge: HOME | End: 2025-06-23
Payer: MEDICARE

## 2025-06-23 PROCEDURE — 77412 RADIATION TX DELIVERY LVL 3: CPT | Performed by: RADIOLOGY

## 2025-06-24 ENCOUNTER — HOSPITAL ENCOUNTER (OUTPATIENT)
Dept: RADIATION ONCOLOGY | Age: 85
Discharge: HOME | End: 2025-06-24
Payer: MEDICARE

## 2025-06-24 PROCEDURE — 77412 RADIATION TX DELIVERY LVL 3: CPT | Performed by: RADIOLOGY

## 2025-06-25 ENCOUNTER — HOSPITAL ENCOUNTER (OUTPATIENT)
Dept: RADIATION ONCOLOGY | Age: 85
Discharge: HOME | End: 2025-06-25
Payer: MEDICARE

## 2025-06-25 VITALS
RESPIRATION RATE: 18 BRPM | OXYGEN SATURATION: 99 % | HEART RATE: 100 BPM | SYSTOLIC BLOOD PRESSURE: 100 MMHG | TEMPERATURE: 97.7 F | DIASTOLIC BLOOD PRESSURE: 60 MMHG

## 2025-06-25 PROCEDURE — 77412 RADIATION TX DELIVERY LVL 3: CPT | Performed by: RADIOLOGY

## 2025-06-25 NOTE — PROGRESS NOTES
Brenda Alamo  6/25/2025  Wt Readings from Last 3 Encounters:   06/02/25 61.1 kg (134 lb 9.6 oz)   05/19/25 60.4 kg (133 lb 1.6 oz)   05/14/25 65.8 kg (145 lb)     There is no height or weight on file to calculate BMI.        Treatment Area:Left Breast + Axilla    Patient was seen today for weekly visit.     Comfort Alteration  KPS:70%  Fatigue: None    Nutritional Alteration  Anorexia: No   Nausea: No   Vomiting: No     Skin Alteration   Sensation:Good    Radiation Dermatitis:  no    Mucous Membrane Alteration  Drainage: No  Lymphedema: No    Emotional  Coping: effective    Sexuality Alteration  na    Injury, potential bleeding or infection: no        Lab Results   Component Value Date    WBC 7.6 04/30/2025    HGB 11.4 (L) 04/30/2025    HCT 36.1 04/30/2025     04/30/2025         /60   Pulse 100   Temp 97.7 °F (36.5 °C) (Temporal)   Resp 18   SpO2 99%   BP within normal range? yes     Assessment/Plan:4/9fx;1600/3600cGy completed.    Seema Pham RN

## 2025-06-25 NOTE — ON TREATMENT VISIT
DEPARTMENT OF RADIATION ONCOLOGY   ON TREATMENT VISIT       6/25/2025      NAME:  Brenda Alamo    YOB: 1940    DIAGNOSIS:     SUBJECTIVE:   Brenda Alamo has now received 1600 cGy in 9 fractions directed to the Left breast and axilla.      Past medical, surgical, social and family histories reviewed and updated as indicated.    PAIN: Ms. Alamo noted that she was experiencing some discomfort involving the left breast as well as the axilla.  A dressing was in place and I was able to visualize the open wound.  There was no active drainage and everything was contained within the bandage.  I did not appreciate any foul order at this time.  She was alert and oriented but had difficulty describing her wound care at this time.    ALLERGIES:  Alendronate sodium         Current Outpatient Medications   Medication Sig Dispense Refill    citalopram (CELEXA) 10 MG tablet Take 1 tablet by mouth daily      traZODone (DESYREL) 50 MG tablet Take 1 tablet by mouth nightly Give 0.5 mg tablet by mouth one time a day for insomnia      acetaminophen (TYLENOL) 650 MG suppository Place 1 suppository rectally every 4 hours as needed for Fever Give 650 mg by mouth every 4 hours as needed for pain      acetaminophen (TYLENOL) 650 MG suppository Place 1 suppository rectally every 4 hours as needed for Fever Give 650 mg by mouth every 4 hours as needed for ELEV TEMPABOVE 100.4F      metroNIDAZOLE (FLAGYL) 250 MG tablet Take 1 tablet by mouth 3 times daily Apply to left breast CA mass topically every day shift for breast CA mass per wound NP crush 250 mg and sprinkle over mass D/T severity and maceration      metroNIDAZOLE (FLAGYL) 250 MG tablet Take 1 tablet by mouth 3 times daily      mirtazapine (REMERON) 7.5 MG tablet Take 1 tablet by mouth nightly Give 7.5 mg by mouth at bedtime for insomnia, mood, appetite      polyethylene glycol (GLYCOLAX) 17 GM/SCOOP powder Take 17 g by mouth daily Give 17 grams by mouth every 24 hours

## 2025-06-26 ENCOUNTER — HOSPITAL ENCOUNTER (OUTPATIENT)
Dept: RADIATION ONCOLOGY | Age: 85
Discharge: HOME | End: 2025-06-26
Payer: MEDICARE

## 2025-06-26 PROCEDURE — 77412 RADIATION TX DELIVERY LVL 3: CPT | Performed by: RADIOLOGY

## 2025-06-26 PROCEDURE — 77336 RADIATION PHYSICS CONSULT: CPT | Performed by: RADIOLOGY

## 2025-06-26 PROCEDURE — 77417 THER RADIOLOGY PORT IMAGE(S): CPT | Performed by: RADIOLOGY

## 2025-06-27 ENCOUNTER — HOSPITAL ENCOUNTER (OUTPATIENT)
Dept: RADIATION ONCOLOGY | Age: 85
Discharge: HOME | End: 2025-06-27
Payer: MEDICARE

## 2025-06-27 PROCEDURE — 77412 RADIATION TX DELIVERY LVL 3: CPT | Performed by: RADIOLOGY

## 2025-06-30 ENCOUNTER — HOSPITAL ENCOUNTER (OUTPATIENT)
Dept: RADIATION ONCOLOGY | Age: 85
Discharge: HOME | End: 2025-06-30
Payer: MEDICARE

## 2025-06-30 ENCOUNTER — TELEPHONE (OUTPATIENT)
Dept: GERIATRIC MEDICINE | Age: 85
End: 2025-06-30

## 2025-06-30 PROCEDURE — 77412 RADIATION TX DELIVERY LVL 3: CPT | Performed by: RADIOLOGY

## 2025-06-30 NOTE — TELEPHONE ENCOUNTER
Patient daughter Jennifer called. She states that Brenda is very depressed, anxious, and crying. She said that she feels the nursing does not want Brenda up and walking. Rajiv brandt suggested that Brenda be put on mirtazapine. Jennifer wants to know should her ativan be increased or possible med change. Patient is also shuffling her feet when walking.       Please advise-  -Samira

## 2025-07-01 ENCOUNTER — HOSPITAL ENCOUNTER (OUTPATIENT)
Dept: RADIATION ONCOLOGY | Age: 85
Discharge: HOME | End: 2025-07-01
Payer: MEDICARE

## 2025-07-01 PROCEDURE — 77412 RADIATION TX DELIVERY LVL 3: CPT | Performed by: RADIOLOGY

## 2025-07-02 ENCOUNTER — HOSPITAL ENCOUNTER (OUTPATIENT)
Dept: RADIATION ONCOLOGY | Age: 85
Discharge: HOME | End: 2025-07-02
Payer: MEDICARE

## 2025-07-02 VITALS
TEMPERATURE: 97.5 F | SYSTOLIC BLOOD PRESSURE: 117 MMHG | HEART RATE: 92 BPM | OXYGEN SATURATION: 96 % | RESPIRATION RATE: 18 BRPM | DIASTOLIC BLOOD PRESSURE: 58 MMHG

## 2025-07-02 DIAGNOSIS — C50.812 MALIGNANT NEOPLASM OF OVERLAPPING SITES OF LEFT BREAST IN FEMALE, ESTROGEN RECEPTOR NEGATIVE (HCC): Primary | ICD-10-CM

## 2025-07-02 DIAGNOSIS — Z17.1 MALIGNANT NEOPLASM OF OVERLAPPING SITES OF LEFT BREAST IN FEMALE, ESTROGEN RECEPTOR NEGATIVE (HCC): Primary | ICD-10-CM

## 2025-07-02 PROCEDURE — 77336 RADIATION PHYSICS CONSULT: CPT | Performed by: RADIOLOGY

## 2025-07-02 PROCEDURE — 77412 RADIATION TX DELIVERY LVL 3: CPT | Performed by: RADIOLOGY

## 2025-07-02 NOTE — PROGRESS NOTES
Brenda Alamo  7/2/2025  Wt Readings from Last 3 Encounters:   06/02/25 61.1 kg (134 lb 9.6 oz)   05/19/25 60.4 kg (133 lb 1.6 oz)   05/14/25 65.8 kg (145 lb)     There is no height or weight on file to calculate BMI.        Treatment Area:Left breast + axilla    Patient was seen today for weekly visit.     Comfort Alteration  KPS:60%  Fatigue: None    Nutritional Alteration  Anorexia: No   Nausea: No   Vomiting: No     Skin Alteration   Sensation:no issues    Radiation Dermatitis:  slight redness, instructed her 2 sons to make sure staff is moisturizing her skin at least 2 times daily moisturize her skin    Mucous Membrane Alteration  Drainage: No  Lymphedema: No    Emotional  Coping: effective    Sexuality Alteration  na    Injury, potential bleeding or infection: na        Lab Results   Component Value Date    WBC 7.6 04/30/2025    HGB 11.4 (L) 04/30/2025    HCT 36.1 04/30/2025     04/30/2025         BP (!) 117/58   Pulse 92   Temp 97.5 °F (36.4 °C) (Temporal)   Resp 18   SpO2 96%   BP within normal range? yes       Assessment/Plan: Completed treatment today 9 fractions; 3600 cGy    Kareem Ray RN

## 2025-07-02 NOTE — PROGRESS NOTES
Brenda Alamo  7/2/2025  7:31 AM          Current Outpatient Medications   Medication Sig Dispense Refill    citalopram (CELEXA) 10 MG tablet Take 1 tablet by mouth daily      traZODone (DESYREL) 50 MG tablet Take 1 tablet by mouth nightly Give 0.5 mg tablet by mouth one time a day for insomnia      acetaminophen (TYLENOL) 650 MG suppository Place 1 suppository rectally every 4 hours as needed for Fever Give 650 mg by mouth every 4 hours as needed for pain      acetaminophen (TYLENOL) 650 MG suppository Place 1 suppository rectally every 4 hours as needed for Fever Give 650 mg by mouth every 4 hours as needed for ELEV TEMPABOVE 100.4F      metroNIDAZOLE (FLAGYL) 250 MG tablet Take 1 tablet by mouth 3 times daily Apply to left breast CA mass topically every day shift for breast CA mass per wound NP crush 250 mg and sprinkle over mass D/T severity and maceration      metroNIDAZOLE (FLAGYL) 250 MG tablet Take 1 tablet by mouth 3 times daily      mirtazapine (REMERON) 7.5 MG tablet Take 1 tablet by mouth nightly Give 7.5 mg by mouth at bedtime for insomnia, mood, appetite      polyethylene glycol (GLYCOLAX) 17 GM/SCOOP powder Take 17 g by mouth daily Give 17 grams by mouth every 24 hours as needed for constipation      amoxicillin-clavulanate (AUGMENTIN) 875-125 MG per tablet       doxycycline hyclate (VIBRA-TABS) 100 MG tablet       metroNIDAZOLE (METROGEL) 1 % gel       OXcarbazepine (TRILEPTAL) 150 MG tablet       traMADol (ULTRAM) 50 MG tablet       Menthol, Topical Analgesic, (BIOFREEZE COOL THE PAIN) 4 % GEL Apply topically      LORazepam (ATIVAN) 0.5 MG tablet Take 1 tablet by mouth every 6 hours as needed for Anxiety.      sodium hypochlorite (DAKINS) 0.125 % SOLN external solution Apply 1 each topically daily. May also apply 1 each as needed (if dressing is saturated or soiled). 30 each 3    melatonin 3 MG TABS tablet Take 1 tablet by mouth nightly (Patient taking differently: Take 10 mg by mouth nightly)

## 2025-07-02 NOTE — PROGRESS NOTES
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    SUBJECTIVE:   Brenda Alamo has now received 3600 cGy in 9 fractions directed to the left breast and axilla.      Past medical, surgical, social and family histories reviewed and updated as indicated.    PAIN: Moderate to intense.  Patient takes Norco every 4 hours.    ALLERGIES:  Alendronate sodium         Current Outpatient Medications   Medication Sig Dispense Refill    citalopram (CELEXA) 10 MG tablet Take 1 tablet by mouth daily      traZODone (DESYREL) 50 MG tablet Take 1 tablet by mouth nightly Give 0.5 mg tablet by mouth one time a day for insomnia      acetaminophen (TYLENOL) 650 MG suppository Place 1 suppository rectally every 4 hours as needed for Fever Give 650 mg by mouth every 4 hours as needed for pain      acetaminophen (TYLENOL) 650 MG suppository Place 1 suppository rectally every 4 hours as needed for Fever Give 650 mg by mouth every 4 hours as needed for ELEV TEMPABOVE 100.4F      metroNIDAZOLE (FLAGYL) 250 MG tablet Take 1 tablet by mouth 3 times daily Apply to left breast CA mass topically every day shift for breast CA mass per wound NP crush 250 mg and sprinkle over mass D/T severity and maceration      metroNIDAZOLE (FLAGYL) 250 MG tablet Take 1 tablet by mouth 3 times daily      mirtazapine (REMERON) 7.5 MG tablet Take 1 tablet by mouth nightly Give 7.5 mg by mouth at bedtime for insomnia, mood, appetite      polyethylene glycol (GLYCOLAX) 17 GM/SCOOP powder Take 17 g by mouth daily Give 17 grams by mouth every 24 hours as needed for

## 2025-07-08 ENCOUNTER — TELEPHONE (OUTPATIENT)
Dept: PRIMARY CARE CLINIC | Age: 85
End: 2025-07-08

## 2025-07-08 DIAGNOSIS — I10 ESSENTIAL HYPERTENSION: ICD-10-CM

## 2025-07-08 DIAGNOSIS — M81.0 AGE-RELATED OSTEOPOROSIS WITHOUT CURRENT PATHOLOGICAL FRACTURE: Primary | ICD-10-CM

## 2025-07-08 DIAGNOSIS — E78.2 MIXED HYPERLIPIDEMIA: ICD-10-CM

## 2025-07-08 DIAGNOSIS — E11.9 TYPE 2 DIABETES MELLITUS WITHOUT COMPLICATION, WITHOUT LONG-TERM CURRENT USE OF INSULIN (HCC): ICD-10-CM

## 2025-07-08 RX ORDER — HYDROCODONE BITARTRATE AND ACETAMINOPHEN 5; 325 MG/1; MG/1
1 TABLET ORAL EVERY 6 HOURS PRN
COMMUNITY
Start: 2025-07-03

## 2025-07-08 NOTE — TELEPHONE ENCOUNTER
Pt needs bmp for prolia infusion if she should even continue to get it at this time,. Spoke with Kleber pts son who states a lot has happened to him mom since Dr Hidalgo was off. His mom had breast cancer with open wounds, had to have radiation treatment. She needs an appointment to see her PCP, She did see Dr Lama at her assisted living facility but did not change to him for PCP. She has been also seeing Dr Tamayo for management of dementia but with everything going on she is not doing good. All pts treatment at [facility] so Dr Hidalgo to please review     advise

## 2025-07-31 ENCOUNTER — TELEPHONE (OUTPATIENT)
Dept: FAMILY MEDICINE CLINIC | Age: 85
End: 2025-07-31

## 2025-07-31 NOTE — TELEPHONE ENCOUNTER
Bailey from [facility] called in states pt is taking Bumex one time weekly, she is taking omeprazole instead of Protonix and is not currently using eye drop  Ofloxacin

## 2025-08-12 ENCOUNTER — TELEPHONE (OUTPATIENT)
Age: 85
End: 2025-08-12

## 2025-08-14 ENCOUNTER — HOSPITAL ENCOUNTER (OUTPATIENT)
Dept: INFUSION THERAPY | Age: 85
Setting detail: INFUSION SERIES
Discharge: HOME | End: 2025-08-14
Payer: MEDICARE

## 2025-08-14 ENCOUNTER — HOSPITAL ENCOUNTER (OUTPATIENT)
Dept: RADIATION ONCOLOGY | Age: 85
Discharge: HOME | End: 2025-08-14

## 2025-08-14 VITALS — BODY MASS INDEX: 27.17 KG/M2 | WEIGHT: 134.5 LBS

## 2025-08-14 DIAGNOSIS — Z17.1 MALIGNANT NEOPLASM OF OVERLAPPING SITES OF LEFT BREAST IN FEMALE, ESTROGEN RECEPTOR NEGATIVE (HCC): Primary | ICD-10-CM

## 2025-08-14 DIAGNOSIS — C50.812 MALIGNANT NEOPLASM OF OVERLAPPING SITES OF LEFT BREAST IN FEMALE, ESTROGEN RECEPTOR NEGATIVE (HCC): Primary | ICD-10-CM

## 2025-08-14 DIAGNOSIS — M80.00XD AGE-RELATED OSTEOPOROSIS WITH CURRENT PATHOLOGICAL FRACTURE WITH ROUTINE HEALING, SUBSEQUENT ENCOUNTER: Primary | ICD-10-CM

## 2025-08-14 PROCEDURE — 96372 THER/PROPH/DIAG INJ SC/IM: CPT

## 2025-08-14 PROCEDURE — 6360000002 HC RX W HCPCS: Performed by: FAMILY MEDICINE

## 2025-08-14 RX ORDER — ALBUTEROL SULFATE 90 UG/1
4 INHALANT RESPIRATORY (INHALATION) PRN
OUTPATIENT
Start: 2026-02-12

## 2025-08-14 RX ORDER — SODIUM CHLORIDE 9 MG/ML
INJECTION, SOLUTION INTRAVENOUS CONTINUOUS
OUTPATIENT
Start: 2026-02-12

## 2025-08-14 RX ORDER — HYDROCORTISONE SODIUM SUCCINATE 100 MG/2ML
100 INJECTION INTRAMUSCULAR; INTRAVENOUS
OUTPATIENT
Start: 2026-02-12

## 2025-08-14 RX ORDER — DIPHENHYDRAMINE HYDROCHLORIDE 50 MG/ML
50 INJECTION, SOLUTION INTRAMUSCULAR; INTRAVENOUS
OUTPATIENT
Start: 2026-02-12

## 2025-08-14 RX ORDER — ANASTROZOLE 1 MG/1
1 TABLET ORAL DAILY
COMMUNITY
Start: 2025-08-04 | End: 2025-08-14 | Stop reason: CLARIF

## 2025-08-14 RX ORDER — EPINEPHRINE 1 MG/ML
0.3 INJECTION, SOLUTION, CONCENTRATE INTRAVENOUS PRN
OUTPATIENT
Start: 2026-02-12

## 2025-08-14 RX ORDER — ONDANSETRON 2 MG/ML
8 INJECTION INTRAMUSCULAR; INTRAVENOUS
OUTPATIENT
Start: 2026-02-12

## 2025-08-14 RX ORDER — ACETAMINOPHEN 325 MG/1
650 TABLET ORAL
OUTPATIENT
Start: 2026-02-12

## 2025-08-14 RX ADMIN — DENOSUMAB 60 MG: 60 INJECTION SUBCUTANEOUS at 14:05

## 2025-08-18 ENCOUNTER — HOSPITAL ENCOUNTER (OUTPATIENT)
Dept: RADIATION ONCOLOGY | Age: 85
Discharge: HOME | End: 2025-08-18

## 2025-08-18 VITALS
HEART RATE: 100 BPM | TEMPERATURE: 98.2 F | BODY MASS INDEX: 27.71 KG/M2 | WEIGHT: 137.2 LBS | RESPIRATION RATE: 18 BRPM | SYSTOLIC BLOOD PRESSURE: 114 MMHG | DIASTOLIC BLOOD PRESSURE: 60 MMHG | OXYGEN SATURATION: 93 %

## 2025-08-18 DIAGNOSIS — Z17.1 MALIGNANT NEOPLASM OF OVERLAPPING SITES OF LEFT BREAST IN FEMALE, ESTROGEN RECEPTOR NEGATIVE (HCC): Primary | ICD-10-CM

## 2025-08-18 DIAGNOSIS — C50.812 MALIGNANT NEOPLASM OF OVERLAPPING SITES OF LEFT BREAST IN FEMALE, ESTROGEN RECEPTOR NEGATIVE (HCC): Primary | ICD-10-CM

## 2025-08-18 ASSESSMENT — PAIN SCALES - GENERAL: PAINLEVEL_OUTOF10: 0

## 2025-08-21 ENCOUNTER — OUTSIDE SERVICES (OUTPATIENT)
Dept: PRIMARY CARE CLINIC | Age: 85
End: 2025-08-21
Payer: MEDICARE

## 2025-08-21 DIAGNOSIS — G47.00 INSOMNIA, UNSPECIFIED TYPE: ICD-10-CM

## 2025-08-21 DIAGNOSIS — R53.1 GENERALIZED WEAKNESS: ICD-10-CM

## 2025-08-21 DIAGNOSIS — G30.1 LATE ONSET ALZHEIMER'S DISEASE WITH BEHAVIORAL DISTURBANCE (HCC): ICD-10-CM

## 2025-08-21 DIAGNOSIS — F02.818 LATE ONSET ALZHEIMER'S DISEASE WITH BEHAVIORAL DISTURBANCE (HCC): ICD-10-CM

## 2025-08-21 DIAGNOSIS — Z17.1 MALIGNANT NEOPLASM OF OVERLAPPING SITES OF LEFT BREAST IN FEMALE, ESTROGEN RECEPTOR NEGATIVE (HCC): ICD-10-CM

## 2025-08-21 DIAGNOSIS — M81.0 AGE-RELATED OSTEOPOROSIS WITHOUT CURRENT PATHOLOGICAL FRACTURE: ICD-10-CM

## 2025-08-21 DIAGNOSIS — C50.812 MALIGNANT NEOPLASM OF OVERLAPPING SITES OF LEFT BREAST IN FEMALE, ESTROGEN RECEPTOR NEGATIVE (HCC): ICD-10-CM

## 2025-08-21 DIAGNOSIS — E11.9 TYPE 2 DIABETES MELLITUS WITHOUT COMPLICATION, WITHOUT LONG-TERM CURRENT USE OF INSULIN (HCC): Primary | ICD-10-CM

## 2025-08-21 DIAGNOSIS — I10 ESSENTIAL HYPERTENSION: ICD-10-CM

## 2025-08-21 PROCEDURE — 99309 SBSQ NF CARE MODERATE MDM 30: CPT

## 2025-08-26 ENCOUNTER — OFFICE VISIT (OUTPATIENT)
Age: 85
End: 2025-08-26
Payer: MEDICARE

## 2025-08-26 ENCOUNTER — HOSPITAL ENCOUNTER (OUTPATIENT)
Dept: RADIATION ONCOLOGY | Age: 85
Discharge: HOME | End: 2025-08-26
Payer: MEDICARE

## 2025-08-26 VITALS
WEIGHT: 136.2 LBS | DIASTOLIC BLOOD PRESSURE: 66 MMHG | TEMPERATURE: 98.1 F | HEART RATE: 110 BPM | SYSTOLIC BLOOD PRESSURE: 107 MMHG | BODY MASS INDEX: 27.51 KG/M2 | OXYGEN SATURATION: 95 %

## 2025-08-26 DIAGNOSIS — Z51.5 PALLIATIVE CARE BY SPECIALIST: Primary | ICD-10-CM

## 2025-08-26 DIAGNOSIS — N63.20 MASS OF LEFT BREAST, UNSPECIFIED QUADRANT: ICD-10-CM

## 2025-08-26 DIAGNOSIS — Z71.89 GOALS OF CARE, COUNSELING/DISCUSSION: ICD-10-CM

## 2025-08-26 PROCEDURE — 99214 OFFICE O/P EST MOD 30 MIN: CPT | Performed by: NURSE PRACTITIONER

## 2025-08-26 PROCEDURE — 3074F SYST BP LT 130 MM HG: CPT | Performed by: NURSE PRACTITIONER

## 2025-08-26 PROCEDURE — 77334 RADIATION TREATMENT AID(S): CPT | Performed by: RADIOLOGY

## 2025-08-26 PROCEDURE — 1123F ACP DISCUSS/DSCN MKR DOCD: CPT | Performed by: NURSE PRACTITIONER

## 2025-08-26 PROCEDURE — 3078F DIAST BP <80 MM HG: CPT | Performed by: NURSE PRACTITIONER

## 2025-08-26 PROCEDURE — 77290 THER RAD SIMULAJ FIELD CPLX: CPT | Performed by: RADIOLOGY

## 2025-08-27 ASSESSMENT — ENCOUNTER SYMPTOMS
BACK PAIN: 0
VOMITING: 0
DIARRHEA: 0
CONSTIPATION: 0
ABDOMINAL PAIN: 0
WHEEZING: 0
COUGH: 0
SORE THROAT: 0
SHORTNESS OF BREATH: 0
PHOTOPHOBIA: 0
RHINORRHEA: 0

## 2025-09-01 ENCOUNTER — HOSPITAL ENCOUNTER (EMERGENCY)
Age: 85
Discharge: NURSING, REHAB OR LONG-TERM CARE | End: 2025-09-01
Payer: MEDICARE

## 2025-09-01 VITALS
TEMPERATURE: 98.4 F | RESPIRATION RATE: 18 BRPM | WEIGHT: 136 LBS | HEART RATE: 86 BPM | BODY MASS INDEX: 27.42 KG/M2 | HEIGHT: 59 IN | SYSTOLIC BLOOD PRESSURE: 125 MMHG | DIASTOLIC BLOOD PRESSURE: 78 MMHG | OXYGEN SATURATION: 96 %

## 2025-09-01 DIAGNOSIS — S00.31XA ABRASION OF NOSE, INITIAL ENCOUNTER: ICD-10-CM
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
## 2025-09-01 DIAGNOSIS — S01.511A LIP LACERATION, INITIAL ENCOUNTER: ICD-10-CM

## 2025-09-01 DIAGNOSIS — S09.90XA CLOSED HEAD INJURY, INITIAL ENCOUNTER: ICD-10-CM

## 2025-09-01 PROCEDURE — 99283 EMERGENCY DEPT VISIT LOW MDM: CPT

## 2025-09-01 RX ORDER — CHLORHEXIDINE GLUCONATE ORAL RINSE 1.2 MG/ML
15 SOLUTION DENTAL 2 TIMES DAILY
Qty: 420 ML | Refills: 0 | Status: SHIPPED | OUTPATIENT
Start: 2025-09-01 | End: 2025-09-15

## 2025-09-01 ASSESSMENT — PAIN SCALES - GENERAL: PAINLEVEL_OUTOF10: 0

## 2025-09-03 ENCOUNTER — HOSPITAL ENCOUNTER (OUTPATIENT)
Dept: RADIATION ONCOLOGY | Age: 85
Discharge: HOME | End: 2025-09-03
Payer: MEDICARE

## 2025-09-03 PROCEDURE — 77334 RADIATION TREATMENT AID(S): CPT | Performed by: RADIOLOGY

## 2025-09-03 PROCEDURE — 77300 RADIATION THERAPY DOSE PLAN: CPT | Performed by: RADIOLOGY

## 2025-09-03 PROCEDURE — 77295 3-D RADIOTHERAPY PLAN: CPT | Performed by: RADIOLOGY

## (undated) DEVICE — APPLICATOR PREP 26ML 0.7% IOD POVACRYLEX 74% ISO ALC ST

## (undated) DEVICE — SYRINGE 20ML LL S/C 50

## (undated) DEVICE — ADHESIVE SKIN CLOSURE TOP 36 CC HI VISC DERMBND MINI

## (undated) DEVICE — GOWN,SIRUS,FABRNF,XL,20/CS: Brand: MEDLINE

## (undated) DEVICE — C-ARM: Brand: UNBRANDED

## (undated) DEVICE — GAUZE,SPONGE,4"X4",16PLY,XRAY,STRL,LF: Brand: MEDLINE

## (undated) DEVICE — SYRINGE MED 10ML LUERLOCK TIP W/O SFTY DISP

## (undated) DEVICE — DRAPE,REIN 53X77,STERILE: Brand: MEDLINE

## (undated) DEVICE — DRAPE XR CASS L UNIV FIT ADH CLSR

## (undated) DEVICE — GLOVE ORANGE PI 8   MSG9080

## (undated) DEVICE — TRAP,MUCUS SPECIMEN,40CC: Brand: MEDLINE

## (undated) DEVICE — GLOVE SURG 8.5 PF POLYMER WHT STRL SIGN LTX ESSENTIAL LTX

## (undated) DEVICE — MEDIA CONTRAST RX ISOVUE-300 61% 30ML VIALS

## (undated) DEVICE — BONE BIOPSY DEVICE F05A BBD SIZE 3: Brand: MEDTRONIC REUSABLE INSTRUMENTS

## (undated) DEVICE — CLOTH SURG PREP PREOPERATIVE CHLORHEXIDINE GLUC 2% READYPREP

## (undated) DEVICE — 3M™ IOBAN™ 2 ANTIMICROBIAL INCISE DRAPE 6650EZ: Brand: IOBAN™ 2

## (undated) DEVICE — BANDAGE ADH W0.75XL3IN UNIV WVN FAB NAT GEN USE STRP N ADH

## (undated) DEVICE — TAMP K08A XPAN INFLAT BONE  SIZE 20/3-RB: Brand: KYPHON XPANDER™ INFLATABLE BONE TAMP

## (undated) DEVICE — INTRODUCER T15K ONE STEP OID BEVEL: Brand: KYPHON® ONE-STEP™ OSTEO INTRODUCER™ SYSTEM

## (undated) DEVICE — INTENDED FOR TISSUE SEPARATION, AND OTHER PROCEDURES THAT REQUIRE A SHARP SURGICAL BLADE TO PUNCTURE OR CUT.: Brand: BARD-PARKER ® STAINLESS STEEL BLADES

## (undated) DEVICE — PACK,LAPAROTOMY,NO GOWNS: Brand: MEDLINE

## (undated) DEVICE — GOWN,SIRUS,FABRNF,L,20/CS: Brand: MEDLINE

## (undated) DEVICE — SYRINGE A08E KIS INFLATION HP: Brand: KYPHON®  INFLATION SYRINGE

## (undated) DEVICE — DOUBLE BASIN SET: Brand: MEDLINE INDUSTRIES, INC.

## (undated) DEVICE — HYPODERMIC SAFETY NEEDLE: Brand: MAGELLAN

## (undated) DEVICE — JACKSON TABLE POSITIONER KIT: Brand: MEDLINE INDUSTRIES, INC.

## (undated) DEVICE — BONE FILLER DEVICE F04B SIZE 3

## (undated) DEVICE — TOWEL,OR,DSP,ST,BLUE,DLX,10/PK,8PK/CS: Brand: MEDLINE

## (undated) DEVICE — MARKER,SKIN,WI/RULER AND LABELS: Brand: MEDLINE

## (undated) DEVICE — LABEL MED 4 IN SURG PANEL W/ PEN STRL

## (undated) DEVICE — COUNTER NDL 30 COUNT DBL MAG

## (undated) DEVICE — DRAPE THER FLUID WARMING 66X44 IN FLAT SLUSH DBL DISC ORS